# Patient Record
Sex: FEMALE | Race: WHITE | NOT HISPANIC OR LATINO | Employment: FULL TIME | URBAN - METROPOLITAN AREA
[De-identification: names, ages, dates, MRNs, and addresses within clinical notes are randomized per-mention and may not be internally consistent; named-entity substitution may affect disease eponyms.]

---

## 2018-02-09 ENCOUNTER — APPOINTMENT (OUTPATIENT)
Dept: MEDICAL GROUP | Facility: MEDICAL CENTER | Age: 38
End: 2018-02-09

## 2018-04-20 ENCOUNTER — HOSPITAL ENCOUNTER (OUTPATIENT)
Dept: LAB | Facility: MEDICAL CENTER | Age: 38
End: 2018-04-20
Attending: FAMILY MEDICINE
Payer: COMMERCIAL

## 2018-04-20 ENCOUNTER — OFFICE VISIT (OUTPATIENT)
Dept: MEDICAL GROUP | Facility: MEDICAL CENTER | Age: 38
End: 2018-04-20
Payer: COMMERCIAL

## 2018-04-20 VITALS
TEMPERATURE: 98.2 F | BODY MASS INDEX: 26.12 KG/M2 | DIASTOLIC BLOOD PRESSURE: 60 MMHG | HEART RATE: 69 BPM | WEIGHT: 153 LBS | HEIGHT: 64 IN | SYSTOLIC BLOOD PRESSURE: 100 MMHG | OXYGEN SATURATION: 100 % | RESPIRATION RATE: 16 BRPM

## 2018-04-20 DIAGNOSIS — F41.1 GAD (GENERALIZED ANXIETY DISORDER): ICD-10-CM

## 2018-04-20 LAB
ALBUMIN SERPL BCP-MCNC: 4.1 G/DL (ref 3.2–4.9)
ALBUMIN/GLOB SERPL: 1.6 G/DL
ALP SERPL-CCNC: 47 U/L (ref 30–99)
ALT SERPL-CCNC: 16 U/L (ref 2–50)
ANION GAP SERPL CALC-SCNC: 7 MMOL/L (ref 0–11.9)
AST SERPL-CCNC: 18 U/L (ref 12–45)
BILIRUB SERPL-MCNC: 0.5 MG/DL (ref 0.1–1.5)
BUN SERPL-MCNC: 16 MG/DL (ref 8–22)
CALCIUM SERPL-MCNC: 9.1 MG/DL (ref 8.5–10.5)
CHLORIDE SERPL-SCNC: 107 MMOL/L (ref 96–112)
CO2 SERPL-SCNC: 26 MMOL/L (ref 20–33)
CREAT SERPL-MCNC: 0.88 MG/DL (ref 0.5–1.4)
ERYTHROCYTE [DISTWIDTH] IN BLOOD BY AUTOMATED COUNT: 45.8 FL (ref 35.9–50)
GLOBULIN SER CALC-MCNC: 2.6 G/DL (ref 1.9–3.5)
GLUCOSE SERPL-MCNC: 109 MG/DL (ref 65–99)
HCT VFR BLD AUTO: 44.4 % (ref 37–47)
HGB BLD-MCNC: 14.3 G/DL (ref 12–16)
MCH RBC QN AUTO: 30.2 PG (ref 27–33)
MCHC RBC AUTO-ENTMCNC: 32.2 G/DL (ref 33.6–35)
MCV RBC AUTO: 93.9 FL (ref 81.4–97.8)
PLATELET # BLD AUTO: 258 K/UL (ref 164–446)
PMV BLD AUTO: 9.5 FL (ref 9–12.9)
POTASSIUM SERPL-SCNC: 4 MMOL/L (ref 3.6–5.5)
PROT SERPL-MCNC: 6.7 G/DL (ref 6–8.2)
RBC # BLD AUTO: 4.73 M/UL (ref 4.2–5.4)
SODIUM SERPL-SCNC: 140 MMOL/L (ref 135–145)
TSH SERPL DL<=0.005 MIU/L-ACNC: 0.37 UIU/ML (ref 0.38–5.33)
WBC # BLD AUTO: 8.3 K/UL (ref 4.8–10.8)

## 2018-04-20 PROCEDURE — 80053 COMPREHEN METABOLIC PANEL: CPT

## 2018-04-20 PROCEDURE — 99214 OFFICE O/P EST MOD 30 MIN: CPT | Performed by: FAMILY MEDICINE

## 2018-04-20 PROCEDURE — 85027 COMPLETE CBC AUTOMATED: CPT

## 2018-04-20 PROCEDURE — 84443 ASSAY THYROID STIM HORMONE: CPT

## 2018-04-20 PROCEDURE — 36415 COLL VENOUS BLD VENIPUNCTURE: CPT

## 2018-04-20 RX ORDER — VENLAFAXINE HYDROCHLORIDE 37.5 MG/1
37.5 CAPSULE, EXTENDED RELEASE ORAL DAILY
Qty: 90 CAP | Refills: 3 | Status: SHIPPED | OUTPATIENT
Start: 2018-04-20 | End: 2019-11-19

## 2018-04-20 ASSESSMENT — PATIENT HEALTH QUESTIONNAIRE - PHQ9: CLINICAL INTERPRETATION OF PHQ2 SCORE: 0

## 2018-04-20 NOTE — ASSESSMENT & PLAN NOTE
This is an acute problem that started for this patient probably back in October 2017. At that time she and her  discussed their marriage and talked about separation. Within the last 3 weeks she's made the decision that she would be happy her apartment together. 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 Having to figure out childcare issues for their 2 children who are 10 and 15 as well as just dealing with the changes that come with divorce. Patient finds that there are times or she feels overwhelmed and has to literally stepped away to take a moment for herself. She initially thought she was having depression but it sounds like she is having more problems with anxiety. She definitely has some sad feelings around everything but anxiety is the bigger contributor to her problem.

## 2018-04-20 NOTE — PROGRESS NOTES
CC:  The encounter diagnosis was JERAMIE (generalized anxiety disorder).    HISTORY OF THE PRESENT ILLNESS: Patient is a 37 y.o. female. This pleasant patient is here today to establish care and discuss her anxiety issues. 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 Patient will also be losing health care.    Health Maintenance: We will do her Pap at her follow-up visit      JERAMIE (generalized anxiety disorder)  This is an acute problem that started for this patient probably back in October 2017. At that time she and her  discussed their marriage and talked about separation. Within the last 3 weeks she's made the decision that she would be happy her apartment together. 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 Having to figure out childcare issues for their 2 children who are 10 and 15 as well as just dealing with the changes that come with divorce. Patient finds that there are times or she feels overwhelmed and has to literally stepped away to take a moment for herself. She initially thought she was having depression but it sounds like she is having more problems with anxiety. She definitely has some sad feelings around everything but anxiety is the bigger contributor to her problem.    Allergies: Patient has no known allergies.    Current Outpatient Prescriptions Ordered in [facility]   Medication Sig Dispense Refill   • venlafaxine XR (EFFEXOR XR) 37.5 MG CAPSULE SR 24 HR Take 1 Cap by mouth every day. 90 Cap 3     No current Epic-ordered facility-administered medications on file.        Past Medical History:   Diagnosis Date   • Anxiety    • Arrhythmia    • JERAMIE (generalized anxiety disorder) 4/20/2018   • Heart murmur    • Migraine     uncontrolled       Past Surgical History:    Procedure Laterality Date   • DENTAL EXTRACTION(S)         Social History   Substance Use Topics   • Smoking status: Former Smoker     Packs/day: 0.25     Years: 3.00     Types: Cigarettes     Quit date: 11/19/2000   • Smokeless tobacco: Never Used   • Alcohol use No      Comment: one/month       Social History     Social History Narrative   • No narrative on file       Family History   Problem Relation Age of Onset   • Heart Disease Mother      a fib   • Arthritis Mother    • Psychiatry Mother      anxiety   • Arthritis Father      disc herniation   • Psychiatry Father      anxiety/depression   • Hypertension Father    • Hypertension Brother    • Lung Disease Brother      smoker   • Heart Disease Paternal Uncle      MI   • Hypertension Paternal Uncle    • Hyperlipidemia Paternal Uncle    • Lung Disease Paternal Uncle      smoker   • Alcohol/Drug Paternal Uncle      etoh   • Other Maternal Grandmother      Hepatitis c- post treatment   • Hyperlipidemia Maternal Grandmother    • Hypertension Maternal Grandmother    • Arthritis Maternal Grandfather      back issues   • Cancer Paternal Grandmother      breast x 3   • Lung Disease Paternal Grandmother      smoker   • Lung Disease Paternal Grandfather      ex xmoker   • Hypertension Paternal Grandfather    • Lung Disease Paternal Uncle      smoker   • Alcohol/Drug Paternal Uncle      etoh       ROS:     - Constitutional: Negative for fever, chills, unexpected weight change, and fatigue/generalized weakness.     - HEENT: Negative for headaches, vision changes, hearing changes, ear pain, ear discharge, rhinorrhea, sinus congestion, sore throat, and neck pain.      - Respiratory: Negative for cough, sputum production, chest congestion, dyspnea, wheezing, and crackles.      - Cardiovascular: Negative for chest pain, palpitations, orthopnea, and bilateral lower extremity edema.     - Gastrointestinal: Negative for heartburn, nausea, vomiting, abdominal pain, hematochezia,  "melena, diarrhea, constipation, and greasy/foul-smelling stools.     - Genitourinary: Negative for dysuria, polyuria, hematuria, pyuria, urinary urgency, and urinary incontinence.     - Musculoskeletal: Negative for myalgias, back pain, and joint pain.     - Skin: Negative for rash, itching, cyanotic skin color change.     - Neurological: Negative for dizziness, tingling, tremors, focal sensory deficit, focal weakness and headaches.     - Endo/Heme/Allergies: Does not bruise/bleed easily.     - Psychiatric/Behavioral: Negative for depression, suicidal/homicidal ideation and memory loss.        - NOTE: All other systems reviewed and are negative, except as in HPI.      .      Exam: Blood pressure 100/60, pulse 69, temperature 36.8 °C (98.2 °F), resp. rate 16, height 1.626 m (5' 4\"), weight 69.4 kg (153 lb), last menstrual period 03/19/2018, SpO2 100 %, not currently breastfeeding. Body mass index is 26.26 kg/m².    General: Normal appearing. No distress.  HEENT: Normocephalic. Eyes conjunctiva clear lids without ptosis, pupils equal and reactive to light accommodation, ears normal shape and contour, canals are clear bilaterally, tympanic membranes are benign, nasal mucosa benign, oropharynx is without erythema, edema or exudates.   Neck: Supple without JVD or bruit. Thyroid is not enlarged.  Pulmonary: Clear to ausculation.  Normal effort. No rales, ronchi, or wheezing.  Cardiovascular: Regular rate and rhythm without murmur. Carotid and radial pulses are intact and equal bilaterally.  Abdomen: Soft, nontender, nondistended. Normal bowel sounds. Liver and spleen are not palpable  Neurologic: Grossly nonfocal  Lymph: No cervical, supraclavicular or axillary lymph nodes are palpable  Skin: Warm and dry.  No obvious lesions.  Musculoskeletal: Normal gait. No extremity cyanosis, clubbing, or edema.  Psych: Normal mood and affect. Alert and oriented x3. Judgment and insight is normal.    Please note that this dictation " was created using voice recognition software. I have made every reasonable attempt to correct obvious errors, but I expect that there are errors of grammar and possibly content that I did not discover before finalizing the note.      Assessment/Plan  1. JERAMIE (generalized anxiety disorder)  Uncontrolled, this patient has good reason to be having the anxiety that she is suffering with. We discussed treatment. In the past she's been on fluoxetine 20 mg but didn't find it to be that helpful. We will try Effexor XR 37.5 mg daily and then we can increase the dose. She is going to my chart me in 2 weeks and let me know how her mood is doing because we could then increase the dose at that time.  - COMP METABOLIC PANEL; Future  - CBC WITHOUT DIFFERENTIAL; Future  - TSH; Future

## 2018-06-01 ENCOUNTER — TELEPHONE (OUTPATIENT)
Dept: MEDICAL GROUP | Facility: MEDICAL CENTER | Age: 38
End: 2018-06-01

## 2018-06-01 NOTE — TELEPHONE ENCOUNTER
ESTABLISHED PATIENT PRE-VISIT PLANNING     Note: Patient will not be contacted if there is no indication to call.     1.  Reviewed notes from the last few office visits within the medical group: Yes 04/20/2018    2.  If any orders were placed at last visit or intended to be done for this visit (i.e. 6 mos follow-up), do we have Results/Consult Notes?        •  Labs - Labs ordered, completed on 04/20/2018 and results are in chart.   Note: If patient appointment is for lab review and patient did not complete labs, check with provider if OK to reschedule patient until labs completed.       •  Imaging - Imaging was not ordered at last office visit.       •  Referrals - No referrals were ordered at last office visit.    3. Is this appointment scheduled as a Hospital Follow-Up? No    4.  Immunizations were updated in Epic using WebIZ?: Yes       •  Web Iz Recommendations: TDAP    5.  Patient is due for the following Health Maintenance Topics:   Health Maintenance Due   Topic Date Due   • IMM DTaP/Tdap/Td Vaccine (1 - Tdap) 12/11/2000           6.  MDX printed for Provider? NO    7.  Patient was NOT informed to arrive 15 min prior to their scheduled appointment and bring in their medication bottles.

## 2018-06-29 ENCOUNTER — OFFICE VISIT (OUTPATIENT)
Dept: MEDICAL GROUP | Facility: MEDICAL CENTER | Age: 38
End: 2018-06-29
Payer: COMMERCIAL

## 2018-06-29 ENCOUNTER — HOSPITAL ENCOUNTER (OUTPATIENT)
Facility: MEDICAL CENTER | Age: 38
End: 2018-06-29
Attending: INTERNAL MEDICINE
Payer: COMMERCIAL

## 2018-06-29 VITALS
HEIGHT: 64 IN | BODY MASS INDEX: 24.65 KG/M2 | TEMPERATURE: 98.9 F | SYSTOLIC BLOOD PRESSURE: 122 MMHG | HEART RATE: 70 BPM | OXYGEN SATURATION: 100 % | WEIGHT: 144.4 LBS | DIASTOLIC BLOOD PRESSURE: 88 MMHG

## 2018-06-29 DIAGNOSIS — F41.1 GAD (GENERALIZED ANXIETY DISORDER): ICD-10-CM

## 2018-06-29 DIAGNOSIS — Z30.011 ORAL CONTRACEPTION INITIATION: ICD-10-CM

## 2018-06-29 DIAGNOSIS — Z12.4 SCREENING FOR MALIGNANT NEOPLASM OF CERVIX: ICD-10-CM

## 2018-06-29 DIAGNOSIS — Z00.00 ANNUAL PHYSICAL EXAM: ICD-10-CM

## 2018-06-29 PROCEDURE — 87624 HPV HI-RISK TYP POOLED RSLT: CPT

## 2018-06-29 PROCEDURE — 99395 PREV VISIT EST AGE 18-39: CPT | Performed by: INTERNAL MEDICINE

## 2018-06-29 PROCEDURE — 88175 CYTOPATH C/V AUTO FLUID REDO: CPT

## 2018-06-29 RX ORDER — NORETHINDRONE ACETATE AND ETHINYL ESTRADIOL 1.5-30(21)
1 KIT ORAL DAILY
Qty: 84 TAB | Refills: 0 | Status: SHIPPED | OUTPATIENT
Start: 2018-06-29 | End: 2018-08-23 | Stop reason: SDUPTHER

## 2018-06-29 NOTE — PROGRESS NOTES
CHIEF COMPLIANT:  Bobo Fry is a 37 y.o. female who presents for annual exam    Pt has GYN provider: no    Recommendations:  Regular exercise at least 4 days a week  Diet: advised balanced diet  Dental exam at least 1-2 times per year  Sunscreen use: advised    Immunizations:  TdaP:  Pending records    GYN  Last PAP:   2015, normal   Abnormal PAP: no    Menarche:      Menses every month with bleeding for 3 days  No excess cramping or bleeding.   Takes OTC analgesics for cramping  Contraception: want to discuss OCP    CURRENT MEDICATIONS  Current Outpatient Prescriptions   Medication Sig Dispense Refill   • norethindrone-ethinyl estradiol-iron (MICROGESTIN FE1.5/30) 1.5-30 MG-MCG tablet Take 1 Tab by mouth every day. 84 Tab 0   • venlafaxine XR (EFFEXOR XR) 37.5 MG CAPSULE SR 24 HR Take 1 Cap by mouth every day. 90 Cap 3     No current facility-administered medications for this visit.      ALLERGIES  Allergies: Patient has no known allergies.  PAST MEDICAL HISTORY  She  has a past medical history of Anxiety; Arrhythmia; JERAMIE (generalized anxiety disorder) (4/20/2018); Heart murmur; and Migraine. She also has no past medical history of Allergy; Anemia; Arthritis; Asthma; Blood transfusion without reported diagnosis; Cancer (HCC); Cataract; CHF (congestive heart failure) (HCC); Clotting disorder (HCC); COPD (chronic obstructive pulmonary disease) (HCC); Depression; Diabetes (HCC); Diabetic neuropathy (HCC); GERD (gastroesophageal reflux disease); Glaucoma; Goiter; Heart attack (HCC); HIV (human immunodeficiency virus infection) ([facility]); Hyperlipidemia; Hypertension; IBD (inflammatory bowel disease); Kidney disease; Meningitis; Pulmonary emphysema (HCC); Seizure (HCC); Stroke ([facility]); Substance abuse; or Tuberculosis.  SURGICAL HISTORY  She  has a past surgical history that includes dental extraction(s).  SOCIAL HISTORY  Social History   Substance Use Topics   • Smoking status: Former Smoker     Packs/day: 0.25      Years: 3.00     Types: Cigarettes     Quit date: 2000   • Smokeless tobacco: Never Used   • Alcohol use No      Comment: one/month     Social History     Social History Narrative   • No narrative on file     FAMILY HISTORY  Family History   Problem Relation Age of Onset   • Heart Disease Mother      a fib   • Arthritis Mother    • Psychiatry Mother      anxiety   • Arthritis Father      disc herniation   • Psychiatry Father      anxiety/depression   • Hypertension Father    • Hypertension Brother    • Lung Disease Brother      smoker   • Heart Disease Paternal Uncle      MI   • Hypertension Paternal Uncle    • Hyperlipidemia Paternal Uncle    • Lung Disease Paternal Uncle      smoker   • Alcohol/Drug Paternal Uncle      etoh   • Other Maternal Grandmother      Hepatitis c- post treatment   • Hyperlipidemia Maternal Grandmother    • Hypertension Maternal Grandmother    • Arthritis Maternal Grandfather      back issues   • Cancer Paternal Grandmother      breast x 3   • Lung Disease Paternal Grandmother      smoker   • Lung Disease Paternal Grandfather      ex xmoker   • Hypertension Paternal Grandfather    • Lung Disease Paternal Uncle      smoker   • Alcohol/Drug Paternal Uncle      etoh     Family Status   Relation Status   • Mother Alive, age 55y   • Father Alive    no contact   • Brother Alive, age 32y   • Maternal Uncle Alive   • Paternal Uncle    • Maternal Grandmother Alive   • Maternal Grandfather Alive   • Paternal Grandmother    • Paternal Grandfather Alive   • Paternal Uncle Alive     REVIEW OF SYSTEMS  Constitutional: Denies fever, chills, or sweats  Skin: negative for rash, scaling, itching, pigmentation, hair or nail changes.  Eyes: negative for visual blurring, double vision, eye pain, floaters and discharge from eyes  ENT: negative for tinnitus, vertigo, bleeding gums, dental problem and hoarseness, frequent URI's, sinus trouble, persistent sore throat  Respiratory: negative  "for persistent cough, hemoptysis, dyspnea, recurrent pneumonia or bronchitis, asthma and wheezing  Cardiovascular: negative for palpitations, tachycardia, irregular heart beat, chest pain, or peripheral edema.  Breast: Denies breast tenderness, mass, discharge, changes in size or contour, or abnormal cyclic discomfort.  Gastrointestinal: negative for poor appetite, dysphagia, nausea, heartburn or reflux, abdominal pain, hemorrhoids, constipation or diarrhea  Genitourinary: negative for dysuria, frequency, hesitancy, incontinence, sexually transmitted disease, abnormal vaginal discharge/bleeding, dysparunia   Musculoskeletal: negative for joint swelling and muscle pain/ soreness  Neuro: negative for migraine headaches, involuntary movements or tremor  Psychiatric: negative for excessive alcohol consumption or illegal drug use, sleep problems, anxiety, depression, sexual difficulties  Hematologic/Lymphatic/Immunologic: negative for anemia, unusual bruising, swollen glands, HIV risk factors  Endocrine: negative for temperature intolerance, polydipsia, polyuria.     PHYSICAL EXAMINATION:  Blood pressure 122/88, pulse 70, temperature 37.2 °C (98.9 °F), height 1.626 m (5' 4\"), weight 65.5 kg (144 lb 6.4 oz), SpO2 100 %.  Body mass index is 24.79 kg/m².  Wt Readings from Last 4 Encounters:   06/29/18 65.5 kg (144 lb 6.4 oz)   04/20/18 69.4 kg (153 lb)   11/19/15 82.6 kg (182 lb)     General appearance:healthy, well developed, well nourished, well-groomed  Psych: alert, no distress, cooperative. Eye contact good, speech goal directed. Affect calm.   Eyes: conjunctivae and sclerae normal, lids and lashes normal, EOMI, PERRLA  ENT: Ears: external ears normal to inspection, canals clear. TMs pearly gray with normal light reflex and anatomic landmarks, Nose/Sinuses: nose shows no deformity, asymmetry, or inflammation, nasal mucosa normal, no sinus tenderness,   Oropharynx: lips normal without lesions, buccal mucosa normal, gums " healthy, teeth intact, non-carious, palate normal, tongue midline and normal  Neck: no asymmetry, masses, adenopathy. Thyroid normal to palpation, carotids without bruits, no jugular venous distention  Lungs: clear to auscultation with good excursion, Normal respiratory rate. Chest symmetric no chest wall tenderness.  Cardiovascular: Regular rate and rhythm, no murmur.  No peripheral edema  Abdomen:  Soft, non-tender, no masses, HSM or palpable renal abnormalities. Bowel sounds normal, no bruits heard. No CVAT.  Musculoskeletal: no evidence of joint effusion, ROM of all joints is normal, no crepitation detected, strength grossly normal UE and LE, proximal and distal motor groups. No deformities present  Lymphatic: None significantly enlarged supraclavicular, axillary, or inguinal  Skin: color normal, vascularity normal, no rashes or suspicious lesions, no evidence of bleeding or bruising  Neuro: speech normal, mental status intact, gait grossly normal, muscle tone normal.  GYN: No suprapubic tenderness.  Perineum and external genitalia normal without rash.   Vagina with without discharge, normal mucosa.  Cervix w/o visible lesions or discharge. No CMT  Uterus normal size, non-tender, no masses,   Adnexa w/o mass or tenderness.   PAP smear was obtained.    LABS    None    ASSESSMENT/PLAN    1. Annual physical exam  Reviewed PMH, PSH, FH, SH, ALL, MEDS, HCM/IMM.   Advised healthy habits, diet, exercise.    2. Screening for malignant neoplasm of cervix  - THINPREP PAP WITH HPV; Future    3. Oral contraception initiation  - norethindrone-ethinyl estradiol-iron (MICROGESTIN FE1.5/30) 1.5-30 MG-MCG tablet; Take 1 Tab by mouth every day.  Dispense: 84 Tab; Refill: 0    - Reviewed effects and side effects of medication, the patient verbalized understanding    4. JERAMIE (generalized anxiety disorder)  Controlled, continue with current med    Smoking Counseling: Nonsmoker    Next office visit is due in  1 year    All questions  are answered.     Please note that this dictation was created using voice recognition software. Despite all efforts, some minor grammar mistakes are possible.

## 2018-06-30 DIAGNOSIS — Z12.4 SCREENING FOR MALIGNANT NEOPLASM OF CERVIX: ICD-10-CM

## 2018-07-03 LAB
CYTOLOGY REG CYTOL: NORMAL
HPV HR 12 DNA CVX QL NAA+PROBE: NEGATIVE
HPV16 DNA SPEC QL NAA+PROBE: NEGATIVE
HPV18 DNA SPEC QL NAA+PROBE: NEGATIVE
SPECIMEN SOURCE: NORMAL

## 2018-08-23 DIAGNOSIS — Z30.011 ORAL CONTRACEPTION INITIATION: ICD-10-CM

## 2018-08-23 RX ORDER — NORETHINDRONE ACETATE/ETHINYL ESTRADIOL AND FERROUS FUMARATE 1.5-30(21)
KIT ORAL
Qty: 84 TAB | Refills: 3 | Status: SHIPPED | OUTPATIENT
Start: 2018-08-23 | End: 2019-11-19 | Stop reason: SDUPTHER

## 2018-11-15 ENCOUNTER — HOSPITAL ENCOUNTER (OUTPATIENT)
Facility: MEDICAL CENTER | Age: 38
End: 2018-11-15
Attending: PHYSICIAN ASSISTANT

## 2018-11-15 ENCOUNTER — OFFICE VISIT (OUTPATIENT)
Dept: URGENT CARE | Facility: PHYSICIAN GROUP | Age: 38
End: 2018-11-15

## 2018-11-15 VITALS
TEMPERATURE: 98.1 F | SYSTOLIC BLOOD PRESSURE: 132 MMHG | WEIGHT: 145 LBS | HEIGHT: 64 IN | RESPIRATION RATE: 14 BRPM | BODY MASS INDEX: 24.75 KG/M2 | DIASTOLIC BLOOD PRESSURE: 90 MMHG | OXYGEN SATURATION: 95 % | HEART RATE: 112 BPM

## 2018-11-15 DIAGNOSIS — R30.0 DYSURIA: ICD-10-CM

## 2018-11-15 DIAGNOSIS — G43.809 OTHER MIGRAINE WITHOUT STATUS MIGRAINOSUS, NOT INTRACTABLE: ICD-10-CM

## 2018-11-15 DIAGNOSIS — R31.9 HEMATURIA, UNSPECIFIED TYPE: ICD-10-CM

## 2018-11-15 DIAGNOSIS — N30.01 ACUTE CYSTITIS WITH HEMATURIA: Primary | ICD-10-CM

## 2018-11-15 LAB
APPEARANCE UR: NORMAL
BILIRUB UR STRIP-MCNC: NORMAL MG/DL
COLOR UR AUTO: NORMAL
GLUCOSE UR STRIP.AUTO-MCNC: NORMAL MG/DL
KETONES UR STRIP.AUTO-MCNC: NORMAL MG/DL
LEUKOCYTE ESTERASE UR QL STRIP.AUTO: NORMAL
NITRITE UR QL STRIP.AUTO: NORMAL
PH UR STRIP.AUTO: 5.5 [PH] (ref 5–8)
PROT UR QL STRIP: 100 MG/DL
RBC UR QL AUTO: NORMAL
SP GR UR STRIP.AUTO: 1.02
UROBILINOGEN UR STRIP-MCNC: NORMAL MG/DL

## 2018-11-15 PROCEDURE — 99214 OFFICE O/P EST MOD 30 MIN: CPT | Performed by: PHYSICIAN ASSISTANT

## 2018-11-15 PROCEDURE — 87077 CULTURE AEROBIC IDENTIFY: CPT

## 2018-11-15 PROCEDURE — 87186 SC STD MICRODIL/AGAR DIL: CPT

## 2018-11-15 PROCEDURE — 87086 URINE CULTURE/COLONY COUNT: CPT

## 2018-11-15 PROCEDURE — 81002 URINALYSIS NONAUTO W/O SCOPE: CPT | Performed by: PHYSICIAN ASSISTANT

## 2018-11-15 RX ORDER — SUMATRIPTAN 50 MG/1
50 TABLET, FILM COATED ORAL
Qty: 10 TAB | Refills: 2 | Status: SHIPPED | OUTPATIENT
Start: 2018-11-15 | End: 2019-11-19

## 2018-11-15 RX ORDER — SULFAMETHOXAZOLE AND TRIMETHOPRIM 800; 160 MG/1; MG/1
1 TABLET ORAL EVERY 12 HOURS
Qty: 10 TAB | Refills: 0 | Status: SHIPPED | OUTPATIENT
Start: 2018-11-15 | End: 2018-11-20

## 2018-11-15 RX ORDER — PHENAZOPYRIDINE HYDROCHLORIDE 200 MG/1
200 TABLET, FILM COATED ORAL 3 TIMES DAILY
Qty: 6 TAB | Refills: 0 | Status: SHIPPED | OUTPATIENT
Start: 2018-11-15 | End: 2018-11-17

## 2018-11-15 NOTE — PROGRESS NOTES
Chief Complaint   Patient presents with   • Blood in Urine       HISTORY OF PRESENT ILLNESS: Patient is a 37 y.o. female who presents today because she has 2 complaints.    1.  She has a one-week history of increased urinary urgency, frequency, dysuria.  She has been trying over-the-counter cranberry pills and increasing her p.o. fluids but has not been helping.  Denies any fevers, chills, nausea, vomiting or diarrhea.    She also has what she describes as a migraine.  She states that she has diagnosed with migraines about 10 years ago but they have been kept at bay by staying hydrated, exercising regularly.  The last couple days she has had pain in her temporal areas bilaterally, right worse than left, no visual disturbances or changes, no numbness or tingling or auras.  She has been overusing over-the-counter anti-inflammatories without improvement.    Patient Active Problem List    Diagnosis Date Noted   • JERAMIE (generalized anxiety disorder) 04/20/2018       Allergies:Patient has no known allergies.    Current Outpatient Prescriptions Ordered in [facility]   Medication Sig Dispense Refill   • sulfamethoxazole-trimethoprim (BACTRIM DS) 800-160 MG tablet Take 1 Tab by mouth every 12 hours for 5 days. 10 Tab 0   • phenazopyridine (PYRIDIUM) 200 MG Tab Take 1 Tab by mouth 3 times a day for 2 days. 6 Tab 0   • SUMAtriptan (IMITREX) 50 MG Tab Take 1 Tab by mouth Once PRN for Migraine for up to 1 dose. 10 Tab 2   • PATITO FE 1.5/30 1.5-30 MG-MCG tablet TAKE 1 TABLET BY MOUTH ONCE DAILY 84 Tab 3   • venlafaxine XR (EFFEXOR XR) 37.5 MG CAPSULE SR 24 HR Take 1 Cap by mouth every day. 90 Cap 3     No current Epic-ordered facility-administered medications on file.        Past Medical History:   Diagnosis Date   • Anxiety    • Arrhythmia    • JERAMIE (generalized anxiety disorder) 4/20/2018   • Heart murmur    • Migraine     uncontrolled       Social History   Substance Use Topics   • Smoking status: Former Smoker     Packs/day: 0.25      Years: 3.00     Types: Cigarettes     Quit date: 2000   • Smokeless tobacco: Never Used   • Alcohol use No      Comment: one/month       Family Status   Relation Status   • Mo Alive, age 55y   • Fa Alive        no contact   • Bro Alive, age 32y   • MUnc Alive   • PUnc    • MGMo Alive   • MGFa Alive   • PGMo    • PGFa Alive   • PUnc Alive     Family History   Problem Relation Age of Onset   • Heart Disease Mother         a fib   • Arthritis Mother    • Psychiatry Mother         anxiety   • Arthritis Father         disc herniation   • Psychiatry Father         anxiety/depression   • Hypertension Father    • Hypertension Brother    • Lung Disease Brother         smoker   • Heart Disease Paternal Uncle         MI   • Hypertension Paternal Uncle    • Hyperlipidemia Paternal Uncle    • Lung Disease Paternal Uncle         smoker   • Alcohol/Drug Paternal Uncle         etoh   • Other Maternal Grandmother         Hepatitis c- post treatment   • Hyperlipidemia Maternal Grandmother    • Hypertension Maternal Grandmother    • Arthritis Maternal Grandfather         back issues   • Cancer Paternal Grandmother         breast x 3   • Lung Disease Paternal Grandmother         smoker   • Lung Disease Paternal Grandfather         ex xmoker   • Hypertension Paternal Grandfather    • Lung Disease Paternal Uncle         smoker   • Alcohol/Drug Paternal Uncle         etoh       ROS:  Review of Systems   Constitutional: Negative for fever, chills, weight loss and malaise/fatigue.   HENT: Negative for ear pain, nosebleeds, congestion, sore throat and neck pain.    Eyes: Negative for blurred vision.   Respiratory: Negative for cough, sputum production, shortness of breath and wheezing.    Cardiovascular: Negative for chest pain, palpitations, orthopnea and leg swelling.   Gastrointestinal: Negative for heartburn, nausea, vomiting and abdominal pain.   Genitourinary: Positive for dysuria, urgency and frequency.  "    Exam:  Blood pressure 132/90, pulse (!) 112, temperature 36.7 °C (98.1 °F), temperature source Temporal, resp. rate 14, height 1.626 m (5' 4\"), weight 65.8 kg (145 lb), SpO2 95 %, not currently breastfeeding.  General:  Well nourished, well developed female in NAD  Head:Normocephalic, atraumatic  Eyes: PERRLA, EOM within normal limits, no conjunctival injection, no scleral icterus, visual fields and acuity grossly intact.  Ears: Normal shape and symmetry, no tenderness, no discharge. External canals are without any significant edema or erythema. Tympanic membranes are without any inflammation, no effusion. Gross auditory acuity is intact  Nose: Symmetrical without tenderness, no discharge.  Mouth: reasonable hygiene, no erythema exudates or tonsillar enlargement.  Neck: no masses, range of motion within normal limits, no tracheal deviation. No obvious thyroid enlargement.  Pulmonary: chest is symmetrical with respiration, no wheezes, crackles, or rhonchi.  Cardiovascular: regular rate and rhythm without murmurs, rubs, or gallops.  Extremities: no clubbing, cyanosis, or edema.    Urinalysis in the office shows a large amount of leukocyte esterase, and a large amount of blood    Please note that this dictation was created using voice recognition software. I have made every reasonable attempt to correct obvious errors, but I expect that there are errors of grammar and possibly content that I did not discover before finalizing the note.    Assessment/Plan:  1. Acute cystitis with hematuria  Urine Culture    sulfamethoxazole-trimethoprim (BACTRIM DS) 800-160 MG tablet   2. Dysuria  phenazopyridine (PYRIDIUM) 200 MG Tab   3. Hematuria, unspecified type  POCT Urinalysis   4. Other migraine without status migrainosus, not intractable  SUMAtriptan (IMITREX) 50 MG Tab   Continue to increase p.o. fluids.    Followup with primary care in the next 7-10 days if not significantly improving, return to the urgent care or go to " the emergency room sooner for any worsening of symptoms.

## 2018-11-18 LAB
BACTERIA UR CULT: ABNORMAL
BACTERIA UR CULT: ABNORMAL
SIGNIFICANT IND 70042: ABNORMAL
SITE SITE: ABNORMAL
SOURCE SOURCE: ABNORMAL

## 2019-11-19 ENCOUNTER — OFFICE VISIT (OUTPATIENT)
Dept: MEDICAL GROUP | Facility: MEDICAL CENTER | Age: 39
End: 2019-11-19
Payer: COMMERCIAL

## 2019-11-19 VITALS
DIASTOLIC BLOOD PRESSURE: 64 MMHG | HEIGHT: 64 IN | BODY MASS INDEX: 27.66 KG/M2 | TEMPERATURE: 98.2 F | WEIGHT: 162.04 LBS | HEART RATE: 86 BPM | SYSTOLIC BLOOD PRESSURE: 122 MMHG | RESPIRATION RATE: 12 BRPM | OXYGEN SATURATION: 96 %

## 2019-11-19 DIAGNOSIS — Z00.00 WELLNESS EXAMINATION: ICD-10-CM

## 2019-11-19 DIAGNOSIS — F41.1 GAD (GENERALIZED ANXIETY DISORDER): ICD-10-CM

## 2019-11-19 DIAGNOSIS — Z30.011 ORAL CONTRACEPTION INITIATION: ICD-10-CM

## 2019-11-19 DIAGNOSIS — Z30.8 ENCOUNTER FOR OTHER CONTRACEPTIVE MANAGEMENT: ICD-10-CM

## 2019-11-19 DIAGNOSIS — G43.009 MIGRAINE WITHOUT AURA AND WITHOUT STATUS MIGRAINOSUS, NOT INTRACTABLE: ICD-10-CM

## 2019-11-19 PROBLEM — G43.909 MIGRAINE WITHOUT STATUS MIGRAINOSUS, NOT INTRACTABLE: Status: ACTIVE | Noted: 2019-11-19

## 2019-11-19 PROCEDURE — 99395 PREV VISIT EST AGE 18-39: CPT | Performed by: FAMILY MEDICINE

## 2019-11-19 RX ORDER — SUMATRIPTAN 100 MG/1
100 TABLET, FILM COATED ORAL
Qty: 10 TAB | Refills: 3 | Status: SHIPPED | OUTPATIENT
Start: 2019-11-19 | End: 2020-11-24

## 2019-11-19 RX ORDER — NORETHINDRONE ACETATE AND ETHINYL ESTRADIOL 1.5-30(21)
KIT ORAL
Qty: 84 TAB | Refills: 3 | Status: SHIPPED | OUTPATIENT
Start: 2019-11-19 | End: 2020-08-03

## 2019-11-19 ASSESSMENT — PATIENT HEALTH QUESTIONNAIRE - PHQ9: CLINICAL INTERPRETATION OF PHQ2 SCORE: 0

## 2019-11-19 NOTE — ASSESSMENT & PLAN NOTE
This patient had a Pap in 2018 that was completely normal.  She is good for her Pap for the coming 3 years.  She is interested in looking at a more permanent form of birth control.  She is currently using birth control pills.  We talked about having her tubes tied versus having an IUD placed.  Either way she needs to be seen by 1 of the GYNs.  I will put in that referral and then she can be seen by them and have that discussion.

## 2019-11-19 NOTE — ASSESSMENT & PLAN NOTE
This is a chronic health problem for this patient for which she utilizes Imitrex 50 mg but there are times where she has had to take a second dose.  I would like to move her up to the 100 mg dose.  For the most part these will take care of her migraines.  She typically has about 1/month about a week ahead of her menstrual cycles.  We will try her on a higher dose and write that for her today.

## 2019-11-19 NOTE — ASSESSMENT & PLAN NOTE
This had been a problem for the patient in the past that she was going through her divorce.  She is now working out on a regular basis and finds that she no longer needs the Effexor extended release.  We will discontinue that from her medications.  She really is doing well just utilizing her own natural endorphins that she gets from working out.

## 2019-11-19 NOTE — PROGRESS NOTES
CC:Diagnoses of Encounter for other contraceptive management, Migraine without aura and without status migrainosus, not intractable, Oral contraception initiation, JERAMIE (generalized anxiety disorder), and Wellness examination were pertinent to this visit.    HISTORY OF PRESENT ILLNESS: Patient is a 38 y.o. female established patient who presents today to to have her annual wellness exam.  Patient does not need a Pap smear at this time.  Her last Pap was done in 2018 and was completely normal.  She is good till 2021.  She does have some concerns about looking for longer term contraception and then needs refills of her meds.      Encounter for other contraceptive management  This patient had a Pap in 2018 that was completely normal.  She is good for her Pap for the coming 3 years.  She is interested in looking at a more permanent form of birth control.  She is currently using birth control pills.  We talked about having her tubes tied versus having an IUD placed.  Either way she needs to be seen by 1 of the GYNs.  I will put in that referral and then she can be seen by them and have that discussion.    Migraine without status migrainosus, not intractable  This is a chronic health problem for this patient for which she utilizes Imitrex 50 mg but there are times where she has had to take a second dose.  I would like to move her up to the 100 mg dose.  For the most part these will take care of her migraines.  She typically has about 1/month about a week ahead of her menstrual cycles.  We will try her on a higher dose and write that for her today.    JERAMIE (generalized anxiety disorder)  This had been a problem for the patient in the past that she was going through her divorce.  She is now working out on a regular basis and finds that she no longer needs the Effexor extended release.  We will discontinue that from her medications.  She really is doing well just utilizing her own natural endorphins that she gets from working  out.    Wellness examination  Patient here today to have her wellness exam      Patient Active Problem List    Diagnosis Date Noted   • Encounter for other contraceptive management 2019   • Migraine without status migrainosus, not intractable 2019   • Wellness examination 2019   • JERAMIE (generalized anxiety disorder) 2018      Allergies:Patient has no known allergies.    Current Outpatient Medications   Medication Sig Dispense Refill   • sumatriptan (IMITREX) 100 MG tablet Take 1 Tab by mouth Once PRN for Migraine for up to 1 dose. 10 Tab 3   • norethindrone-ethinyl estradiol-iron (PATITO FE 1.530) 1.5-30 MG-MCG tablet TAKE 1 TABLET BY MOUTH ONCE DAILY 84 Tab 3     No current facility-administered medications for this visit.        Social History     Tobacco Use   • Smoking status: Former Smoker     Packs/day: 0.25     Years: 3.00     Pack years: 0.75     Types: Cigarettes     Last attempt to quit: 2000     Years since quittin.0   • Smokeless tobacco: Never Used   Substance Use Topics   • Alcohol use: No     Alcohol/week: 0.0 oz     Comment: one/month   • Drug use: No     Social History     Patient does not qualify to have social determinant information on file (likely too young).   Social History Narrative   • Not on file       Family History   Problem Relation Age of Onset   • Heart Disease Mother         a fib   • Arthritis Mother    • Psychiatric Illness Mother         anxiety   • Arthritis Father         disc herniation   • Psychiatric Illness Father         anxiety/depression   • Hypertension Father    • Hypertension Brother    • Lung Disease Brother         smoker   • Heart Disease Paternal Uncle         MI   • Hypertension Paternal Uncle    • Hyperlipidemia Paternal Uncle    • Lung Disease Paternal Uncle         smoker   • Alcohol/Drug Paternal Uncle         etoh   • Other Maternal Grandmother         Hepatitis c- post treatment   • Hyperlipidemia Maternal Grandmother    •  "Hypertension Maternal Grandmother    • Arthritis Maternal Grandfather         back issues   • Cancer Paternal Grandmother         breast x 3   • Lung Disease Paternal Grandmother         smoker   • Lung Disease Paternal Grandfather         ex xmoker   • Hypertension Paternal Grandfather    • Lung Disease Paternal Uncle         smoker   • Alcohol/Drug Paternal Uncle         etoh        ROS:     - Constitutional:  Negative for fever, chills, unexpected weight change, and fatigue/generalized weakness.    - HEENT:  Negative for headaches, vision changes, hearing changes, ear pain, ear discharge, rhinorrhea, sinus congestion, sore throat, and neck pain.      - Respiratory: Negative for cough, sputum production, chest congestion, dyspnea, wheezing, and crackles.      - Cardiovascular: Negative for chest pain, palpitations, orthopnea, and bilateral lower extremity edema.     - Gastrointestinal: Negative for heartburn, nausea, vomiting, abdominal pain, hematochezia, melena, diarrhea, constipation, and greasy/foul-smelling stools.     - Genitourinary: Negative for dysuria, polyuria, hematuria, pyuria, urinary urgency, and urinary incontinence.     - Musculoskeletal: Negative for myalgias, back pain, and joint pain.     - Skin: Negative for rash, itching, cyanotic skin color change.     - Neurological: Negative for dizziness, tingling, tremors, focal sensory deficit, focal weakness and headaches.     - Endo/Heme/Allergies: Does not bruise/bleed easily.     - Psychiatric/Behavioral: Negative for depression, suicidal/homicidal ideation and memory loss.          - NOTE: All other systems reviewed and are negative, except as in HPI.      Exam:    /64 (BP Location: Left arm, Patient Position: Sitting, BP Cuff Size: Adult)   Pulse 86   Temp 36.8 °C (98.2 °F) (Temporal)   Resp 12   Ht 1.626 m (5' 4\")   Wt 73.5 kg (162 lb 0.6 oz)   SpO2 96%  Body mass index is 27.81 kg/m².    General:  Well nourished, well developed " female in NAD  Head is grossly normal.  Neck: Supple without JVD or bruit. Thyroid is not enlarged.  Pulmonary: Clear to ausculation and percussion.  Normal effort. No rales, ronchi, or wheezing.  Cardiovascular: Regular rate and rhythm without murmur. Carotid and radial pulses are intact and equal bilaterally.  Extremities: no clubbing, cyanosis, or edema.    Please note that this dictation was created using voice recognition software. I have made every reasonable attempt to correct obvious errors, but I expect that there are errors of grammar and possibly content that I did not discover before finalizing the note.    Assessment/Plan:  1. Encounter for other contraceptive management  Referring to OB/GYN to discuss contraceptive choices  - REFERRAL TO OB/GYN    2. Migraine without aura and without status migrainosus, not intractable  Well-controlled but will increase her dose of Imitrex to 100 mg new prescription provided.    3. Oral contraception initiation  Prescription provided for the coming year of birth control but patient will talk with OB/GYN about getting an IUD versus tubal ligation.  - norethindrone-ethinyl estradiol-iron (PATITO FE 1.5/30) 1.5-30 MG-MCG tablet; TAKE 1 TABLET BY MOUTH ONCE DAILY  Dispense: 84 Tab; Refill: 3    4. JERAMIE (generalized anxiety disorder)  Patient no longer having severe anxiety and able to come off of medication.  Doing very well.  5. Wellness exam  Overall patient doing very well

## 2020-01-16 ENCOUNTER — OFFICE VISIT (OUTPATIENT)
Dept: URGENT CARE | Facility: PHYSICIAN GROUP | Age: 40
End: 2020-01-16
Payer: COMMERCIAL

## 2020-01-16 VITALS
BODY MASS INDEX: 28.34 KG/M2 | HEIGHT: 64 IN | RESPIRATION RATE: 12 BRPM | SYSTOLIC BLOOD PRESSURE: 132 MMHG | DIASTOLIC BLOOD PRESSURE: 88 MMHG | OXYGEN SATURATION: 99 % | HEART RATE: 84 BPM | TEMPERATURE: 97.4 F | WEIGHT: 166 LBS

## 2020-01-16 DIAGNOSIS — J02.9 PHARYNGITIS, UNSPECIFIED ETIOLOGY: ICD-10-CM

## 2020-01-16 DIAGNOSIS — J02.9 SORE THROAT: ICD-10-CM

## 2020-01-16 LAB
INT CON NEG: NEGATIVE
INT CON POS: POSITIVE
S PYO AG THROAT QL: NEGATIVE

## 2020-01-16 PROCEDURE — 99214 OFFICE O/P EST MOD 30 MIN: CPT | Performed by: PHYSICIAN ASSISTANT

## 2020-01-16 PROCEDURE — 87880 STREP A ASSAY W/OPTIC: CPT | Performed by: PHYSICIAN ASSISTANT

## 2020-01-16 RX ORDER — AMOXICILLIN 875 MG/1
875 TABLET, COATED ORAL 2 TIMES DAILY
Qty: 20 TAB | Refills: 0 | Status: SHIPPED | OUTPATIENT
Start: 2020-01-16 | End: 2020-01-26

## 2020-01-17 NOTE — PROGRESS NOTES
Chief Complaint   Patient presents with   • Pharyngitis       HISTORY OF PRESENT ILLNESS: Patient is a 39 y.o. female who presents today because she has a 2-day history of sore throat, headaches, chills, has not taken her temperature.  She has been using Motrin for symptoms with minimal improvement.    Patient Active Problem List    Diagnosis Date Noted   • Encounter for other contraceptive management 2019   • Migraine without status migrainosus, not intractable 2019   • Wellness examination 2019   • JERAMIE (generalized anxiety disorder) 2018       Allergies:Patient has no known allergies.    Current Outpatient Medications Ordered in Epic   Medication Sig Dispense Refill   • maalox plus-benadryl-visc lidocaine (MAGIC MOUTHWASH) Take 5 mL by mouth every 6 hours as needed. 90 mL 0   • amoxicillin (AMOXIL) 875 MG tablet Take 1 Tab by mouth 2 times a day for 10 days. 20 Tab 0   • norethindrone-ethinyl estradiol-iron (PATITO FE 1.5/30) 1.5-30 MG-MCG tablet TAKE 1 TABLET BY MOUTH ONCE DAILY 84 Tab 3   • sumatriptan (IMITREX) 100 MG tablet Take 1 Tab by mouth Once PRN for Migraine for up to 1 dose. (Patient not taking: Reported on 2020) 10 Tab 3     No current Epic-ordered facility-administered medications on file.        Past Medical History:   Diagnosis Date   • Anxiety    • Arrhythmia    • Encounter for other contraceptive management 2019   • JERAMIE (generalized anxiety disorder) 2018   • Heart murmur    • Migraine     uncontrolled   • Migraine without status migrainosus, not intractable 2019       Social History     Tobacco Use   • Smoking status: Former Smoker     Packs/day: 0.25     Years: 3.00     Pack years: 0.75     Types: Cigarettes     Last attempt to quit: 2000     Years since quittin.1   • Smokeless tobacco: Never Used   Substance Use Topics   • Alcohol use: No     Alcohol/week: 0.0 oz     Comment: one/month   • Drug use: No       Family Status   Relation Name  Status   • Mo  Alive, age 56y   • Fa  Alive        no contact   • Bro  Alive, age 33y   • MUnc  Alive   • PUnc     • MGMo  Alive   • MGFa  Alive   • PGMo     • PGFa  Alive   • PUnc  Alive     Family History   Problem Relation Age of Onset   • Heart Disease Mother         a fib   • Arthritis Mother    • Psychiatric Illness Mother         anxiety   • Arthritis Father         disc herniation   • Psychiatric Illness Father         anxiety/depression   • Hypertension Father    • Hypertension Brother    • Lung Disease Brother         smoker   • Heart Disease Paternal Uncle         MI   • Hypertension Paternal Uncle    • Hyperlipidemia Paternal Uncle    • Lung Disease Paternal Uncle         smoker   • Alcohol/Drug Paternal Uncle         etoh   • Other Maternal Grandmother         Hepatitis c- post treatment   • Hyperlipidemia Maternal Grandmother    • Hypertension Maternal Grandmother    • Arthritis Maternal Grandfather         back issues   • Cancer Paternal Grandmother         breast x 3   • Lung Disease Paternal Grandmother         smoker   • Lung Disease Paternal Grandfather         ex xmoker   • Hypertension Paternal Grandfather    • Lung Disease Paternal Uncle         smoker   • Alcohol/Drug Paternal Uncle         etoh       ROS:  Review of Systems   Constitutional: Negative for fever, positive for chills, no weight loss and malaise/fatigue.   HENT: Negative for ear pain, nosebleeds, congestion, positive for sore throat and neck pain.    Eyes: Negative for blurred vision.   Respiratory: Negative for cough, sputum production, shortness of breath and wheezing.    Cardiovascular: Negative for chest pain, palpitations, orthopnea and leg swelling.   Gastrointestinal: Negative for heartburn, nausea, vomiting and abdominal pain.   Genitourinary: Negative for dysuria, urgency and frequency.     Exam:  /88 (BP Location: Right arm, Patient Position: Sitting, BP Cuff Size: Adult)   Pulse 84   Temp 36.3  "°C (97.4 °F) (Temporal)   Resp 12   Ht 1.626 m (5' 4\")   Wt 75.3 kg (166 lb)   SpO2 99%   General:  Well nourished, well developed female in NAD  Head:Normocephalic, atraumatic  Eyes: PERRLA, EOM within normal limits, no conjunctival injection, no scleral icterus, visual fields and acuity grossly intact.  Ears: Normal shape and symmetry, no tenderness, no discharge. External canals are without any significant edema or erythema. Tympanic membranes are without any inflammation, no effusion. Gross auditory acuity is intact  Nose: Symmetrical without tenderness, no discharge.  Mouth: reasonable hygiene, she has pharyngeal arch erythema without exudates or tonsillar enlargement.  Neck: There is minimal bilateral anterior cervical lymph node enlargement and tenderness, range of motion within normal limits, no tracheal deviation. No obvious thyroid enlargement.  Extremities: no clubbing, cyanosis, or edema.    Strep test is negative    Please note that this dictation was created using voice recognition software. I have made every reasonable attempt to correct obvious errors, but I expect that there are errors of grammar and possibly content that I did not discover before finalizing the note.    Assessment/Plan:  1. Sore throat  POCT Rapid Strep A    maalox plus-benadryl-visc lidocaine (MAGIC MOUTHWASH)   2. Pharyngitis, unspecified etiology  amoxicillin (AMOXIL) 875 MG tablet   Continue Tylenol or ibuprofen as needed. abx on contingency    Followup with primary care in the next 7-10 days if not significantly improving, return to the urgent care or go to the emergency room sooner for any worsening of symptoms.       "

## 2020-07-31 DIAGNOSIS — Z30.011 ORAL CONTRACEPTION INITIATION: ICD-10-CM

## 2020-08-03 RX ORDER — NORETHINDRONE ACETATE AND ETHINYL ESTRADIOL 1.5-30(21)
KIT ORAL
Qty: 84 TAB | Refills: 3 | Status: SHIPPED | OUTPATIENT
Start: 2020-08-03 | End: 2020-11-24

## 2020-11-24 ENCOUNTER — OFFICE VISIT (OUTPATIENT)
Dept: URGENT CARE | Facility: PHYSICIAN GROUP | Age: 40
End: 2020-11-24
Payer: COMMERCIAL

## 2020-11-24 ENCOUNTER — HOSPITAL ENCOUNTER (OUTPATIENT)
Facility: MEDICAL CENTER | Age: 40
End: 2020-11-24
Attending: PHYSICIAN ASSISTANT
Payer: COMMERCIAL

## 2020-11-24 VITALS
TEMPERATURE: 98.2 F | OXYGEN SATURATION: 98 % | SYSTOLIC BLOOD PRESSURE: 144 MMHG | HEIGHT: 64 IN | WEIGHT: 171 LBS | HEART RATE: 100 BPM | DIASTOLIC BLOOD PRESSURE: 92 MMHG | RESPIRATION RATE: 16 BRPM | BODY MASS INDEX: 29.19 KG/M2

## 2020-11-24 DIAGNOSIS — R35.0 URINARY FREQUENCY: ICD-10-CM

## 2020-11-24 DIAGNOSIS — R39.15 URINARY URGENCY: ICD-10-CM

## 2020-11-24 DIAGNOSIS — N30.01 ACUTE CYSTITIS WITH HEMATURIA: ICD-10-CM

## 2020-11-24 DIAGNOSIS — R30.0 DYSURIA: ICD-10-CM

## 2020-11-24 LAB
APPEARANCE UR: NORMAL
BILIRUB UR STRIP-MCNC: NORMAL MG/DL
COLOR UR AUTO: NORMAL
GLUCOSE UR STRIP.AUTO-MCNC: NORMAL MG/DL
KETONES UR STRIP.AUTO-MCNC: NORMAL MG/DL
LEUKOCYTE ESTERASE UR QL STRIP.AUTO: NORMAL
NITRITE UR QL STRIP.AUTO: NORMAL
PH UR STRIP.AUTO: 6.5 [PH] (ref 5–8)
PROT UR QL STRIP: 100 MG/DL
RBC UR QL AUTO: NORMAL
SP GR UR STRIP.AUTO: 1.01
UROBILINOGEN UR STRIP-MCNC: NORMAL MG/DL

## 2020-11-24 PROCEDURE — 87086 URINE CULTURE/COLONY COUNT: CPT

## 2020-11-24 PROCEDURE — 81002 URINALYSIS NONAUTO W/O SCOPE: CPT | Performed by: PHYSICIAN ASSISTANT

## 2020-11-24 PROCEDURE — 87077 CULTURE AEROBIC IDENTIFY: CPT

## 2020-11-24 PROCEDURE — 99214 OFFICE O/P EST MOD 30 MIN: CPT | Performed by: PHYSICIAN ASSISTANT

## 2020-11-24 PROCEDURE — 87186 SC STD MICRODIL/AGAR DIL: CPT

## 2020-11-24 RX ORDER — CEFDINIR 300 MG/1
300 CAPSULE ORAL EVERY 12 HOURS
Qty: 10 CAP | Refills: 0 | Status: SHIPPED | OUTPATIENT
Start: 2020-11-24 | End: 2020-11-29

## 2020-11-24 RX ORDER — PHENAZOPYRIDINE HYDROCHLORIDE 200 MG/1
200 TABLET, FILM COATED ORAL 3 TIMES DAILY PRN
Qty: 10 TAB | Refills: 0 | Status: SHIPPED | OUTPATIENT
Start: 2020-11-24 | End: 2022-04-20

## 2020-11-24 NOTE — LETTER
November 24, 2020         Patient: Bobo Fry   YOB: 1980   Date of Visit: 11/24/2020           To Whom it May Concern:    Bobo Fry was seen in my clinic on 11/24/2020. Please excuse her for missing work tomorrow due to illness.    If you have any questions or concerns, please don't hesitate to call.        Sincerely,           Mildred Farrell P.A.-C.  Electronically Signed

## 2020-11-25 DIAGNOSIS — N30.01 ACUTE CYSTITIS WITH HEMATURIA: ICD-10-CM

## 2020-11-25 NOTE — PROGRESS NOTES
Chief Complaint   Patient presents with   • UTI       HISTORY OF PRESENT ILLNESS: Patient is a 39 y.o. female who presents today for the following:    Patient comes in for evaluation of dysuria that started today.  She complains of increased urinary frequency and urgency, lower abdominal discomfort, low back pain.  She does report nausea but denies fever and vomiting.  She does report history of UTI and states it feels the same.    Patient Active Problem List    Diagnosis Date Noted   • Encounter for other contraceptive management 2019   • Migraine without status migrainosus, not intractable 2019   • Wellness examination 2019   • JERAMIE (generalized anxiety disorder) 2018       Allergies:Patient has no known allergies.    Current Outpatient Medications Ordered in Epic   Medication Sig Dispense Refill   • cefdinir (OMNICEF) 300 MG Cap Take 1 Cap by mouth every 12 hours for 5 days. 10 Cap 0   • phenazopyridine (PYRIDIUM) 200 MG Tab Take 1 Tab by mouth 3 times a day as needed for Mild Pain. 10 Tab 0     No current Epic-ordered facility-administered medications on file.        Past Medical History:   Diagnosis Date   • Anxiety    • Arrhythmia    • Encounter for other contraceptive management 2019   • JERAMIE (generalized anxiety disorder) 2018   • Heart murmur    • Migraine     uncontrolled   • Migraine without status migrainosus, not intractable 2019       Social History     Tobacco Use   • Smoking status: Former Smoker     Packs/day: 0.25     Years: 3.00     Pack years: 0.75     Types: Cigarettes     Quit date: 2000     Years since quittin.0   • Smokeless tobacco: Never Used   Substance Use Topics   • Alcohol use: No     Alcohol/week: 0.0 oz     Comment: one/month   • Drug use: No       Family Status   Relation Name Status   • Mo  Alive, age 57y   • Fa  Alive        no contact   • Bro  Alive, age 34y   • MUnc  Alive   • PUnc     • MGMo  Alive   • MGFa  Alive   •  "PGMo     • PGFa  Alive   • PUnc  Alive     Family History   Problem Relation Age of Onset   • Heart Disease Mother         a fib   • Arthritis Mother    • Psychiatric Illness Mother         anxiety   • Arthritis Father         disc herniation   • Psychiatric Illness Father         anxiety/depression   • Hypertension Father    • Hypertension Brother    • Lung Disease Brother         smoker   • Heart Disease Paternal Uncle         MI   • Hypertension Paternal Uncle    • Hyperlipidemia Paternal Uncle    • Lung Disease Paternal Uncle         smoker   • Alcohol/Drug Paternal Uncle         etoh   • Other Maternal Grandmother         Hepatitis c- post treatment   • Hyperlipidemia Maternal Grandmother    • Hypertension Maternal Grandmother    • Arthritis Maternal Grandfather         back issues   • Cancer Paternal Grandmother         breast x 3   • Lung Disease Paternal Grandmother         smoker   • Lung Disease Paternal Grandfather         ex xmoker   • Hypertension Paternal Grandfather    • Lung Disease Paternal Uncle         smoker   • Alcohol/Drug Paternal Uncle         etoh       Review of Systems:    Constitutional ROS: No unexpected change in weight, No weakness, No fatigue  Pulmonary ROS: No chronic cough, sputum, or hemoptysis, No dyspnea on exertion, No wheezing  Cardiovascular ROS: No diaphoresis, No edema, No palpitations  Gastrointestinal ROS: No change in bowel habits, No significant change in appetite, No nausea, vomiting, diarrhea, or constipation  Hematologic/Lymphatic ROS: No chills, No night sweats, No weight loss  Skin/Integumentary ROS: No edema, No evidence of rash, No itching      Exam:  /92 (BP Location: Right arm, Patient Position: Sitting, BP Cuff Size: Adult)   Pulse 100   Temp 36.8 °C (98.2 °F) (Temporal)   Resp 16   Ht 1.626 m (5' 4\")   Wt 77.6 kg (171 lb)   SpO2 98%   General: Well developed, well nourished. No distress.  Pulmonary: Unlabored respiratory effort.   Back: No " CVA tenderness noted.  Neurologic: Grossly nonfocal. No facial asymmetry noted.  Skin: Warm, dry, good turgor. No rashes in visible areas.   Psych: Normal mood. Alert and oriented x3. Judgment and insight is normal.    UA: Large blood, 100 protein, small leukocyte esterase, otherwise negative    Assessment/Plan:  Drink plenty of fluids. Will contact patient with culture results. Use all medication as directed. Follow up for worsening or persistent symptoms.  1. Acute cystitis with hematuria  Urine Culture    cefdinir (OMNICEF) 300 MG Cap   2. Dysuria  POCT Urinalysis    phenazopyridine (PYRIDIUM) 200 MG Tab   3. Urinary frequency  POCT Urinalysis    phenazopyridine (PYRIDIUM) 200 MG Tab   4. Urinary urgency  POCT Urinalysis    phenazopyridine (PYRIDIUM) 200 MG Tab

## 2020-12-08 ENCOUNTER — TELEPHONE (OUTPATIENT)
Dept: URGENT CARE | Facility: PHYSICIAN GROUP | Age: 40
End: 2020-12-08

## 2020-12-08 DIAGNOSIS — N30.01 ACUTE CYSTITIS WITH HEMATURIA: ICD-10-CM

## 2020-12-08 RX ORDER — NITROFURANTOIN 25; 75 MG/1; MG/1
100 CAPSULE ORAL 2 TIMES DAILY
Qty: 10 CAP | Refills: 0 | Status: SHIPPED | OUTPATIENT
Start: 2020-12-08 | End: 2020-12-13

## 2020-12-08 NOTE — TELEPHONE ENCOUNTER
Pt called saying her UTI symptoms have not cleared up. She wants to know if you are willing to send more antibiotics to her pharmacy or if she needs to be seen again. Her phone # is [phone] or [phone]

## 2020-12-09 NOTE — TELEPHONE ENCOUNTER
I'll send in another round. She should have it rechecked if she still isn't feeling better to make sure it's not caused by a different bacteria.

## 2022-03-22 ENCOUNTER — TELEPHONE (OUTPATIENT)
Dept: SCHEDULING | Facility: IMAGING CENTER | Age: 42
End: 2022-03-22

## 2022-04-20 ENCOUNTER — OFFICE VISIT (OUTPATIENT)
Dept: MEDICAL GROUP | Facility: PHYSICIAN GROUP | Age: 42
End: 2022-04-20
Payer: COMMERCIAL

## 2022-04-20 VITALS
DIASTOLIC BLOOD PRESSURE: 78 MMHG | SYSTOLIC BLOOD PRESSURE: 138 MMHG | BODY MASS INDEX: 31.79 KG/M2 | HEART RATE: 85 BPM | WEIGHT: 179.4 LBS | RESPIRATION RATE: 12 BRPM | TEMPERATURE: 97.7 F | OXYGEN SATURATION: 100 % | HEIGHT: 63 IN

## 2022-04-20 DIAGNOSIS — F32.A ANXIETY AND DEPRESSION: ICD-10-CM

## 2022-04-20 DIAGNOSIS — N92.6 IRREGULAR MENSTRUAL BLEEDING: ICD-10-CM

## 2022-04-20 DIAGNOSIS — F41.9 ANXIETY AND DEPRESSION: ICD-10-CM

## 2022-04-20 DIAGNOSIS — Z00.00 HEALTH CARE MAINTENANCE: ICD-10-CM

## 2022-04-20 DIAGNOSIS — Z80.9 FAMILY HISTORY OF CANCER: ICD-10-CM

## 2022-04-20 DIAGNOSIS — Z23 NEED FOR VACCINATION: ICD-10-CM

## 2022-04-20 DIAGNOSIS — Z12.31 ENCOUNTER FOR SCREENING MAMMOGRAM FOR MALIGNANT NEOPLASM OF BREAST: ICD-10-CM

## 2022-04-20 PROCEDURE — 90471 IMMUNIZATION ADMIN: CPT

## 2022-04-20 PROCEDURE — 99214 OFFICE O/P EST MOD 30 MIN: CPT | Mod: 25

## 2022-04-20 PROCEDURE — 90715 TDAP VACCINE 7 YRS/> IM: CPT

## 2022-04-20 RX ORDER — HYDROXYZINE HYDROCHLORIDE 25 MG/1
25 TABLET, FILM COATED ORAL 3 TIMES DAILY PRN
Qty: 30 TABLET | Refills: 1 | Status: SHIPPED | OUTPATIENT
Start: 2022-04-20 | End: 2023-03-10 | Stop reason: SDUPTHER

## 2022-04-20 RX ORDER — FLUOXETINE HYDROCHLORIDE 20 MG/1
20 CAPSULE ORAL DAILY
Qty: 30 CAPSULE | Refills: 1 | Status: SHIPPED | OUTPATIENT
Start: 2022-04-20 | End: 2022-05-12

## 2022-04-20 SDOH — ECONOMIC STABILITY: INCOME INSECURITY: HOW HARD IS IT FOR YOU TO PAY FOR THE VERY BASICS LIKE FOOD, HOUSING, MEDICAL CARE, AND HEATING?: NOT VERY HARD

## 2022-04-20 SDOH — ECONOMIC STABILITY: INCOME INSECURITY: IN THE LAST 12 MONTHS, WAS THERE A TIME WHEN YOU WERE NOT ABLE TO PAY THE MORTGAGE OR RENT ON TIME?: NO

## 2022-04-20 SDOH — HEALTH STABILITY: PHYSICAL HEALTH: ON AVERAGE, HOW MANY DAYS PER WEEK DO YOU ENGAGE IN MODERATE TO STRENUOUS EXERCISE (LIKE A BRISK WALK)?: 4 DAYS

## 2022-04-20 SDOH — ECONOMIC STABILITY: TRANSPORTATION INSECURITY
IN THE PAST 12 MONTHS, HAS THE LACK OF TRANSPORTATION KEPT YOU FROM MEDICAL APPOINTMENTS OR FROM GETTING MEDICATIONS?: NO

## 2022-04-20 SDOH — ECONOMIC STABILITY: HOUSING INSECURITY
IN THE LAST 12 MONTHS, WAS THERE A TIME WHEN YOU DID NOT HAVE A STEADY PLACE TO SLEEP OR SLEPT IN A SHELTER (INCLUDING NOW)?: NO

## 2022-04-20 SDOH — ECONOMIC STABILITY: FOOD INSECURITY: WITHIN THE PAST 12 MONTHS, THE FOOD YOU BOUGHT JUST DIDN'T LAST AND YOU DIDN'T HAVE MONEY TO GET MORE.: NEVER TRUE

## 2022-04-20 SDOH — ECONOMIC STABILITY: TRANSPORTATION INSECURITY
IN THE PAST 12 MONTHS, HAS LACK OF TRANSPORTATION KEPT YOU FROM MEETINGS, WORK, OR FROM GETTING THINGS NEEDED FOR DAILY LIVING?: NO

## 2022-04-20 SDOH — ECONOMIC STABILITY: HOUSING INSECURITY: IN THE LAST 12 MONTHS, HOW MANY PLACES HAVE YOU LIVED?: 1

## 2022-04-20 SDOH — HEALTH STABILITY: PHYSICAL HEALTH: ON AVERAGE, HOW MANY MINUTES DO YOU ENGAGE IN EXERCISE AT THIS LEVEL?: 60 MIN

## 2022-04-20 SDOH — ECONOMIC STABILITY: FOOD INSECURITY: WITHIN THE PAST 12 MONTHS, YOU WORRIED THAT YOUR FOOD WOULD RUN OUT BEFORE YOU GOT MONEY TO BUY MORE.: NEVER TRUE

## 2022-04-20 SDOH — ECONOMIC STABILITY: TRANSPORTATION INSECURITY
IN THE PAST 12 MONTHS, HAS LACK OF RELIABLE TRANSPORTATION KEPT YOU FROM MEDICAL APPOINTMENTS, MEETINGS, WORK OR FROM GETTING THINGS NEEDED FOR DAILY LIVING?: NO

## 2022-04-20 SDOH — HEALTH STABILITY: MENTAL HEALTH
STRESS IS WHEN SOMEONE FEELS TENSE, NERVOUS, ANXIOUS, OR CAN'T SLEEP AT NIGHT BECAUSE THEIR MIND IS TROUBLED. HOW STRESSED ARE YOU?: RATHER MUCH

## 2022-04-20 ASSESSMENT — SOCIAL DETERMINANTS OF HEALTH (SDOH)
DO YOU BELONG TO ANY CLUBS OR ORGANIZATIONS SUCH AS CHURCH GROUPS UNIONS, FRATERNAL OR ATHLETIC GROUPS, OR SCHOOL GROUPS?: NO
HOW OFTEN DO YOU ATTENT MEETINGS OF THE CLUB OR ORGANIZATION YOU BELONG TO?: PATIENT DECLINED
IN A TYPICAL WEEK, HOW MANY TIMES DO YOU TALK ON THE PHONE WITH FAMILY, FRIENDS, OR NEIGHBORS?: THREE TIMES A WEEK
DO YOU BELONG TO ANY CLUBS OR ORGANIZATIONS SUCH AS CHURCH GROUPS UNIONS, FRATERNAL OR ATHLETIC GROUPS, OR SCHOOL GROUPS?: NO
HOW OFTEN DO YOU ATTEND CHURCH OR RELIGIOUS SERVICES?: NEVER
HOW OFTEN DO YOU ATTEND CHURCH OR RELIGIOUS SERVICES?: NEVER
HOW HARD IS IT FOR YOU TO PAY FOR THE VERY BASICS LIKE FOOD, HOUSING, MEDICAL CARE, AND HEATING?: NOT VERY HARD
ARE YOU MARRIED, WIDOWED, DIVORCED, SEPARATED, NEVER MARRIED, OR LIVING WITH A PARTNER?: LIVING WITH PARTNER
IN A TYPICAL WEEK, HOW MANY TIMES DO YOU TALK ON THE PHONE WITH FAMILY, FRIENDS, OR NEIGHBORS?: THREE TIMES A WEEK
HOW OFTEN DO YOU ATTENT MEETINGS OF THE CLUB OR ORGANIZATION YOU BELONG TO?: PATIENT DECLINED
HOW OFTEN DO YOU GET TOGETHER WITH FRIENDS OR RELATIVES?: ONCE A WEEK
HOW OFTEN DO YOU GET TOGETHER WITH FRIENDS OR RELATIVES?: ONCE A WEEK
WITHIN THE PAST 12 MONTHS, YOU WORRIED THAT YOUR FOOD WOULD RUN OUT BEFORE YOU GOT THE MONEY TO BUY MORE: NEVER TRUE
HOW OFTEN DO YOU HAVE SIX OR MORE DRINKS ON ONE OCCASION: NEVER
HOW MANY DRINKS CONTAINING ALCOHOL DO YOU HAVE ON A TYPICAL DAY WHEN YOU ARE DRINKING: 1 OR 2
ARE YOU MARRIED, WIDOWED, DIVORCED, SEPARATED, NEVER MARRIED, OR LIVING WITH A PARTNER?: LIVING WITH PARTNER
HOW OFTEN DO YOU HAVE A DRINK CONTAINING ALCOHOL: MONTHLY OR LESS

## 2022-04-20 ASSESSMENT — ENCOUNTER SYMPTOMS
ABDOMINAL PAIN: 1
DEPRESSION: 1
NERVOUS/ANXIOUS: 1

## 2022-04-20 ASSESSMENT — ANXIETY QUESTIONNAIRES
7. FEELING AFRAID AS IF SOMETHING AWFUL MIGHT HAPPEN: NOT AT ALL
4. TROUBLE RELAXING: SEVERAL DAYS
5. BEING SO RESTLESS THAT IT IS HARD TO SIT STILL: SEVERAL DAYS
1. FEELING NERVOUS, ANXIOUS, OR ON EDGE: SEVERAL DAYS
6. BECOMING EASILY ANNOYED OR IRRITABLE: SEVERAL DAYS
3. WORRYING TOO MUCH ABOUT DIFFERENT THINGS: SEVERAL DAYS
2. NOT BEING ABLE TO STOP OR CONTROL WORRYING: SEVERAL DAYS
IF YOU CHECKED OFF ANY PROBLEMS ON THIS QUESTIONNAIRE, HOW DIFFICULT HAVE THESE PROBLEMS MADE IT FOR YOU TO DO YOUR WORK, TAKE CARE OF THINGS AT HOME, OR GET ALONG WITH OTHER PEOPLE: SOMEWHAT DIFFICULT
GAD7 TOTAL SCORE: 6

## 2022-04-20 ASSESSMENT — PATIENT HEALTH QUESTIONNAIRE - PHQ9
CLINICAL INTERPRETATION OF PHQ2 SCORE: 2
SUM OF ALL RESPONSES TO PHQ QUESTIONS 1-9: 8
5. POOR APPETITE OR OVEREATING: 1 - SEVERAL DAYS

## 2022-04-20 ASSESSMENT — LIFESTYLE VARIABLES
HOW OFTEN DO YOU HAVE SIX OR MORE DRINKS ON ONE OCCASION: NEVER
HOW MANY STANDARD DRINKS CONTAINING ALCOHOL DO YOU HAVE ON A TYPICAL DAY: 1 OR 2
HOW OFTEN DO YOU HAVE A DRINK CONTAINING ALCOHOL: MONTHLY OR LESS

## 2022-04-20 NOTE — PROGRESS NOTES
Subjective:     CC:  Diagnoses of Irregular menstrual bleeding, Anxiety and depression, Family history of cancer, Need for vaccination, Encounter for screening mammogram for malignant neoplasm of breast, and Health care maintenance were pertinent to this visit.    HISTORY OF THE PRESENT ILLNESS: Patient is a 41 y.o. female. This pleasant patient is here today to establish care and discuss the following problems:    Problem   Irregular Menstrual Bleeding    Patient reports 6-8 months of irregular periods. Menstraul cycle erratic in nature: Will go 3-4 weeks at times between cycles then at other times will bleed every 2 weeks.  Usual flow described as light, but recently has experienced very heavy flow with pain.  She does have an IUD -Mirena in place since 1.5 years.  She is experiencing pelvic discomfort even when not on menses.  She describes the pain as feeling pressurized/engorged.  She experiences some discomfort most days of the week but rates the worst pain at a 9/10. No pain with sexual intercourse.  Denies constitutional symptoms of fever chills unintentional weight loss or fatigue. Dr[facility] ([facility]) placed IUD, but she has not been seen recently.      Anxiety and Depression    -Chronic intermittent condition.  JERAMIE-7=8, PHQ-9= 6.  Patient denies suicidal ideations.  Recurrent episode of depression/anxiety.  Many stressors current life stressors.  She has used Prozac in the past with good relief.  Denies any side effects from past medication use. She would like to try Prozac again.  States to have good support system at home.  Ascribes to a healthy diet and exercises most days of the week.         Health Maintenance: Tdap given    ROS:   Review of Systems   Gastrointestinal: Positive for abdominal pain (Pelvic pain x6 to 8 months).   Genitourinary:        Irregular menstrual periods   Psychiatric/Behavioral: Positive for depression. The patient is nervous/anxious.          Objective:  "    Exam: /78 (BP Location: Left arm, Patient Position: Sitting, BP Cuff Size: Large adult)   Pulse 85   Temp 36.5 °C (97.7 °F) (Temporal)   Resp 12   Ht 1.603 m (5' 3.1\")   Wt 81.4 kg (179 lb 6.4 oz)   SpO2 100%  Body mass index is 31.68 kg/m².    Physical Exam  Constitutional:       General: She is not in acute distress.     Appearance: Normal appearance. She is not ill-appearing or toxic-appearing.   HENT:      Head: Normocephalic.   Eyes:      Conjunctiva/sclera: Conjunctivae normal.      Pupils: Pupils are equal, round, and reactive to light.   Pulmonary:      Effort: Pulmonary effort is normal.   Skin:     General: Skin is warm and dry.   Neurological:      General: No focal deficit present.      Mental Status: She is alert and oriented to person, place, and time.   Psychiatric:         Mood and Affect: Mood normal.         Behavior: Behavior normal.           Labs: No current labs    Assessment & Plan: Medical Decision Making   41 y.o. female with the following -    Problem List Items Addressed This Visit     Irregular menstrual bleeding     - Subacute ongoing condition  -Transvaginal/abdominal ultrasound ordered           Relevant Orders    US-PELVIC COMPLETE (TRANSABDOMINAL/TRANSVAGINAL) (COMBO)    CBC WITHOUT DIFFERENTIAL    Comp Metabolic Panel    Lipid Profile    TSH WITH REFLEX TO FT4    VITAMIN D,25 HYDROXY    Anxiety and depression     - Chronic condition currently active  -Prozac 20 mg daily ordered.  Side effects, risk/benefits discussed.  -Follow-up recommended in 4 weeks to assess efficacy of medication  -Recommend behavioral counseling, patient may consider this option in the future  -ED precautions given and known for suicidal ideations         Relevant Medications    FLUoxetine (PROZAC) 20 MG Cap    hydrOXYzine HCl (ATARAX) 25 MG Tab      Other Visit Diagnoses     Family history of cancer        Relevant Orders    Referral to Genetic Research Studies    Need for vaccination        " Relevant Orders    Tdap Vaccine =>6YO IM (Completed)    Encounter for screening mammogram for malignant neoplasm of breast        Relevant Orders    MA-SCREENING MAMMO BILAT W/TOMOSYNTHESIS W/CAD    Health care maintenance        Relevant Orders    HEMOGLOBIN A1C    CBC WITHOUT DIFFERENTIAL    Comp Metabolic Panel    Lipid Profile    TSH WITH REFLEX TO FT4    VITAMIN D,25 HYDROXY          Differential diagnosis, natural history, supportive care, and indications for immediate follow-up discussed.  Shared decision making approach was utilized, and patient is amendable with plan of care.  Patient understands to return to clinic or go to the emergency department if symptoms worsen. All questions and concerns addressed.      Return in about 4 weeks (around 5/18/2022) for Wellness with Pap, depression follow-up.    Please note that this dictation was created using voice recognition software. I have made every reasonable attempt to correct obvious errors, but I expect that there are errors of grammar and possibly content that I did not discover before finalizing the note.

## 2022-04-20 NOTE — ASSESSMENT & PLAN NOTE
- Chronic condition currently active  -Prozac 20 mg daily ordered.  Side effects, risk/benefits discussed.  -Follow-up recommended in 4 weeks to assess efficacy of medication  -Recommend behavioral counseling, patient may consider this option in the future  -ED precautions given and known for suicidal ideations

## 2022-04-26 ENCOUNTER — HOSPITAL ENCOUNTER (OUTPATIENT)
Dept: LAB | Facility: MEDICAL CENTER | Age: 42
End: 2022-04-26
Payer: COMMERCIAL

## 2022-04-26 DIAGNOSIS — Z00.00 HEALTH CARE MAINTENANCE: ICD-10-CM

## 2022-04-26 DIAGNOSIS — N92.6 IRREGULAR MENSTRUAL BLEEDING: ICD-10-CM

## 2022-04-26 LAB
25(OH)D3 SERPL-MCNC: 57 NG/ML (ref 30–100)
ALBUMIN SERPL BCP-MCNC: 5 G/DL (ref 3.2–4.9)
ALBUMIN/GLOB SERPL: 2 G/DL
ALP SERPL-CCNC: 60 U/L (ref 30–99)
ALT SERPL-CCNC: 15 U/L (ref 2–50)
ANION GAP SERPL CALC-SCNC: 13 MMOL/L (ref 7–16)
AST SERPL-CCNC: 19 U/L (ref 12–45)
BILIRUB SERPL-MCNC: 0.6 MG/DL (ref 0.1–1.5)
BUN SERPL-MCNC: 21 MG/DL (ref 8–22)
CALCIUM SERPL-MCNC: 9.4 MG/DL (ref 8.5–10.5)
CHLORIDE SERPL-SCNC: 107 MMOL/L (ref 96–112)
CHOLEST SERPL-MCNC: 149 MG/DL (ref 100–199)
CO2 SERPL-SCNC: 22 MMOL/L (ref 20–33)
CREAT SERPL-MCNC: 0.97 MG/DL (ref 0.5–1.4)
ERYTHROCYTE [DISTWIDTH] IN BLOOD BY AUTOMATED COUNT: 42 FL (ref 35.9–50)
EST. AVERAGE GLUCOSE BLD GHB EST-MCNC: 103 MG/DL
FASTING STATUS PATIENT QL REPORTED: NORMAL
GFR SERPLBLD CREATININE-BSD FMLA CKD-EPI: 76 ML/MIN/1.73 M 2
GLOBULIN SER CALC-MCNC: 2.5 G/DL (ref 1.9–3.5)
GLUCOSE SERPL-MCNC: 93 MG/DL (ref 65–99)
HBA1C MFR BLD: 5.2 % (ref 4–5.6)
HCT VFR BLD AUTO: 45.2 % (ref 37–47)
HDLC SERPL-MCNC: 45 MG/DL
HGB BLD-MCNC: 15.3 G/DL (ref 12–16)
LDLC SERPL CALC-MCNC: 95 MG/DL
MCH RBC QN AUTO: 31.1 PG (ref 27–33)
MCHC RBC AUTO-ENTMCNC: 33.8 G/DL (ref 33.6–35)
MCV RBC AUTO: 91.9 FL (ref 81.4–97.8)
PLATELET # BLD AUTO: 264 K/UL (ref 164–446)
PMV BLD AUTO: 9.4 FL (ref 9–12.9)
POTASSIUM SERPL-SCNC: 4.4 MMOL/L (ref 3.6–5.5)
PROT SERPL-MCNC: 7.5 G/DL (ref 6–8.2)
RBC # BLD AUTO: 4.92 M/UL (ref 4.2–5.4)
SODIUM SERPL-SCNC: 142 MMOL/L (ref 135–145)
TRIGL SERPL-MCNC: 45 MG/DL (ref 0–149)
TSH SERPL DL<=0.005 MIU/L-ACNC: 0.88 UIU/ML (ref 0.38–5.33)
WBC # BLD AUTO: 6.4 K/UL (ref 4.8–10.8)

## 2022-04-26 PROCEDURE — 84443 ASSAY THYROID STIM HORMONE: CPT

## 2022-04-26 PROCEDURE — 80053 COMPREHEN METABOLIC PANEL: CPT

## 2022-04-26 PROCEDURE — 80061 LIPID PANEL: CPT

## 2022-04-26 PROCEDURE — 83036 HEMOGLOBIN GLYCOSYLATED A1C: CPT

## 2022-04-26 PROCEDURE — 82306 VITAMIN D 25 HYDROXY: CPT

## 2022-04-26 PROCEDURE — 85027 COMPLETE CBC AUTOMATED: CPT

## 2022-04-26 PROCEDURE — 36415 COLL VENOUS BLD VENIPUNCTURE: CPT

## 2022-04-28 ENCOUNTER — HOSPITAL ENCOUNTER (OUTPATIENT)
Dept: RADIOLOGY | Facility: MEDICAL CENTER | Age: 42
End: 2022-04-28
Payer: COMMERCIAL

## 2022-04-28 DIAGNOSIS — N92.6 IRREGULAR MENSTRUAL BLEEDING: ICD-10-CM

## 2022-04-28 PROCEDURE — 76830 TRANSVAGINAL US NON-OB: CPT

## 2022-04-29 DIAGNOSIS — D25.1 INTRAMURAL LEIOMYOMA OF UTERUS: ICD-10-CM

## 2022-04-29 DIAGNOSIS — N92.6 IRREGULAR MENSTRUAL BLEEDING: ICD-10-CM

## 2022-04-30 NOTE — PROGRESS NOTES
Patient reports irregular periods over the past year.  Sent message today that she has been bleeding since 3-30- 22, menses in the past week has been very heavy with clots and cheeks experiencing profuse abdominal pain.  Recent transvaginal ultrasound showed leiomyoma of the uterine wall 1 cm.  She has an IUD that has been troublesome since insertion a year and a half ago.  She would likely benefit from referral to gynecology.  Referral placed

## 2022-05-18 ENCOUNTER — HOSPITAL ENCOUNTER (OUTPATIENT)
Dept: RADIOLOGY | Facility: MEDICAL CENTER | Age: 42
End: 2022-05-18
Payer: COMMERCIAL

## 2022-05-18 ENCOUNTER — HOSPITAL ENCOUNTER (OUTPATIENT)
Facility: MEDICAL CENTER | Age: 42
End: 2022-05-18
Payer: COMMERCIAL

## 2022-05-18 ENCOUNTER — OFFICE VISIT (OUTPATIENT)
Dept: MEDICAL GROUP | Facility: PHYSICIAN GROUP | Age: 42
End: 2022-05-18
Payer: COMMERCIAL

## 2022-05-18 VITALS
RESPIRATION RATE: 20 BRPM | BODY MASS INDEX: 29.48 KG/M2 | WEIGHT: 166.4 LBS | HEART RATE: 88 BPM | TEMPERATURE: 98.2 F | OXYGEN SATURATION: 96 % | HEIGHT: 63 IN | SYSTOLIC BLOOD PRESSURE: 130 MMHG | DIASTOLIC BLOOD PRESSURE: 76 MMHG

## 2022-05-18 DIAGNOSIS — Z11.51 SCREENING FOR HPV (HUMAN PAPILLOMAVIRUS): ICD-10-CM

## 2022-05-18 DIAGNOSIS — Z01.419 ENCOUNTER FOR GYNECOLOGICAL EXAMINATION: ICD-10-CM

## 2022-05-18 DIAGNOSIS — Z12.31 ENCOUNTER FOR SCREENING MAMMOGRAM FOR MALIGNANT NEOPLASM OF BREAST: ICD-10-CM

## 2022-05-18 DIAGNOSIS — Z12.4 SCREENING FOR CERVICAL CANCER: ICD-10-CM

## 2022-05-18 PROCEDURE — 88175 CYTOPATH C/V AUTO FLUID REDO: CPT

## 2022-05-18 PROCEDURE — 99396 PREV VISIT EST AGE 40-64: CPT

## 2022-05-18 PROCEDURE — 87624 HPV HI-RISK TYP POOLED RSLT: CPT

## 2022-05-18 PROCEDURE — 77063 BREAST TOMOSYNTHESIS BI: CPT

## 2022-05-18 PROCEDURE — 99000 SPECIMEN HANDLING OFFICE-LAB: CPT

## 2022-05-18 ASSESSMENT — FIBROSIS 4 INDEX: FIB4 SCORE: 0.74

## 2022-05-18 NOTE — PROGRESS NOTES
Subjective:     CC:   Chief Complaint   Patient presents with   • Annual Exam   • Depression   • Gynecologic Exam     Pap       HPI:   oBbo Fry is a 41 y.o. female who presents for annual exam    Patient has GYN provider: Yes  Last Pap Smear:     H/O Abnormal Pap: No  Last Mammogram: 22   Last Bone Density Test: none  Last Colorectal Cancer Screening: none  Last Tdap: UTD  Received HPV series: No    Exercise: moderate regular exercise, aerobic < 3 days a week  Diet: good      No LMP recorded.  Hx STDs: No  Birth control: IUD removed now on pill  Menses every month with 5 days with moderate bleeding.  Reports mild cramping and does take OTC analgesics for cramps.  No significant bloating/fluid retention, pelvic pain, or dyspareunia. No abnormal vaginal discharge.  No breast tenderness, mass, nipple discharge, changes in size or contour, or abnormal cyclic discomfort.    OB History    Para Term  AB Living   3 2 0 0 1 0   SAB IAB Ectopic Molar Multiple Live Births   1 0 0 0 0 0      She  reports previously being sexually active and has had partner(s) who are male. She reports using the following method of birth control/protection: I.U.D..    She  has a past medical history of Anxiety, Anxiety and depression (2022), Arrhythmia, Encounter for other contraceptive management (2019), JERAMIE (generalized anxiety disorder) (2018), Heart murmur, Migraine, and Migraine without status migrainosus, not intractable (2019).    She has no past medical history of Allergy, Anemia, Arthritis, Asthma, Blood transfusion without reported diagnosis, Cancer ([facility]), Cataract, CHF (congestive heart failure) ([facility]), Clotting disorder ([facility]), COPD (chronic obstructive pulmonary disease) ([facility]), Diabetes ([facility]), Diabetic neuropathy ([facility]), GERD (gastroesophageal reflux disease), Glaucoma, Goiter, Heart attack ([facility]), HIV (human immunodeficiency virus infection) ([facility]), Hyperlipidemia,  Hypertension, IBD (inflammatory bowel disease), Kidney disease, Meningitis, Pulmonary emphysema (HCC), Seizure (HCC), Stroke (HCC), Substance abuse (HCC), or Tuberculosis.  She  has a past surgical history that includes dental extraction(s).    Family History   Problem Relation Age of Onset   • Heart Disease Mother         a fib   • Arthritis Mother    • Psychiatric Illness Mother         anxiety   • Diabetes Mother    • Arthritis Father         disc herniation   • Psychiatric Illness Father         anxiety/depression   • Hypertension Father    • Hypertension Brother    • Lung Disease Brother         smoker   • Heart Disease Paternal Uncle         MI   • Hypertension Paternal Uncle    • Hyperlipidemia Paternal Uncle    • Lung Disease Paternal Uncle         smoker   • Alcohol/Drug Paternal Uncle         etoh   • Other Maternal Grandmother         Hepatitis c- post treatment   • Hyperlipidemia Maternal Grandmother    • Hypertension Maternal Grandmother    • Arthritis Maternal Grandmother    • Arthritis Maternal Grandfather         back issues   • Cancer Paternal Grandmother         breast x 3   • Lung Disease Paternal Grandmother         smoker   • Breast Cancer Paternal Grandmother    • Lung Disease Paternal Grandfather         ex xmoker   • Hypertension Paternal Grandfather    • Lung Disease Paternal Uncle         smoker   • Alcohol/Drug Paternal Uncle         etoh     Social History     Tobacco Use   • Smoking status: Former Smoker     Packs/day: 0.25     Years: 3.00     Pack years: 0.75     Types: Cigarettes     Quit date: 2000     Years since quittin.5   • Smokeless tobacco: Never Used   Vaping Use   • Vaping Use: Never used   Substance Use Topics   • Alcohol use: No     Alcohol/week: 0.0 oz     Comment: one/month   • Drug use: No       Patient Active Problem List    Diagnosis Date Noted   • Irregular menstrual bleeding 2022   • Anxiety and depression 2022   • Encounter for other  "contraceptive management 11/19/2019   • Migraine without status migrainosus, not intractable 11/19/2019   • Wellness examination 11/19/2019   • JERAMIE (generalized anxiety disorder) 04/20/2018     Current Outpatient Medications   Medication Sig Dispense Refill   • FLUoxetine (PROZAC) 20 MG Cap TAKE 1 CAPSULE BY MOUTH EVERY DAY 90 Capsule 3   • Multiple Vitamin (MULTIVITAMIN PO) Take 2 Each by mouth every day. Gummies     • Multiple Vitamins-Minerals (ZINC PO) Take 1 Tablet by mouth every day.     • VITAMIN D PO Take 1 Capsule by mouth every day.     • COLLAGEN PO Take 4 Capsules by mouth every day.     • hydrOXYzine HCl (ATARAX) 25 MG Tab Take 1 Tablet by mouth 3 times a day as needed for Anxiety. 30 Tablet 1     No current facility-administered medications for this visit.     No Known Allergies    Review of Systems   Constitutional: Negative for fever, chills and malaise/fatigue.   HENT: Negative for congestion.    Eyes: Negative for pain.   Respiratory: Negative for cough and shortness of breath.    Cardiovascular: Negative for chest pain and leg swelling.   Gastrointestinal: Negative for nausea, vomiting, abdominal pain and diarrhea.   Genitourinary: Negative for dysuria and hematuria.   Skin: Negative for rash.   Neurological: Negative for dizziness, focal weakness and headaches.   Endo/Heme/Allergies: Does not bruise/bleed easily.   Psychiatric/Behavioral: Negative for depression.  The patient is not nervous/anxious.      Objective:   /76 (BP Location: Left arm, Patient Position: Sitting, BP Cuff Size: Adult)   Pulse 88   Temp 36.8 °C (98.2 °F) (Temporal)   Resp 20   Ht 1.603 m (5' 3.1\")   Wt 75.5 kg (166 lb 6.4 oz)   SpO2 96%   BMI 29.38 kg/m²     Wt Readings from Last 4 Encounters:   05/18/22 75.5 kg (166 lb 6.4 oz)   04/20/22 81.4 kg (179 lb 6.4 oz)   11/24/20 77.6 kg (171 lb)   01/16/20 75.3 kg (166 lb)       A chaperone was offered to the patient during today's exam. Patient declined " chaperone.    Physical Exam:  Constitutional: Well-developed and well-nourished. Not diaphoretic. No distress.   Skin: Skin is warm and dry. No rash noted.  Head: Atraumatic without lesions.  Eyes: Conjunctivae and extraocular motions are normal. Pupils are equal, round, and reactive to light. No scleral icterus.   Ears:  External ears unremarkable. Tympanic membranes clear and intact.  Nose: Nares patent. Septum midline. Turbinates without erythema nor edema. No discharge.   Mouth/Throat: Tongue normal. Oropharynx is clear and moist. Posterior pharynx without erythema or exudates.  Neck: Supple, trachea midline. Normal range of motion. No thyromegaly present. No lymphadenopathy--cervical or supraclavicular.  Cardiovascular: Regular rate and rhythm, S1 and S2 without murmur, rubs, or gallops.    Respiratory: Effort normal. Clear to auscultation throughout. No adventitious sounds.   Abdomen: Soft, non tender, and without distention. Active bowel sounds in all four quadrants. No rebound, guarding, masses or HSM.  : Perineum and external genitalia normal without rash. Vagina with normal and physiologic discharge. Cervix without visible lesions or discharge.  Negative bimanual exam without adnexal masses or cervical motion tenderness.  Extremities: No cyanosis, clubbing, erythema, nor edema. Distal pulses intact and symmetric.   Musculoskeletal: All major joints AROM full in all directions without pain.  Neurological: Alert and oriented x 3. Grossly non-focal. Strength and sensation grossly intact. DTRs 2+/3 and symmetric.   Psychiatric:  Behavior, mood, and affect are appropriate.    Assessment and Plan:     There are no diagnoses linked to this encounter.    Health maintenance: Complete  Labs per orders  Immunizations per orders  Patient counseled about skin care, diet, supplements, and exercise.  Discussed  mammography screening     Follow-up: Return in about 6 months (around 11/18/2022).

## 2022-12-16 ENCOUNTER — OFFICE VISIT (OUTPATIENT)
Dept: URGENT CARE | Facility: PHYSICIAN GROUP | Age: 42
End: 2022-12-16
Payer: COMMERCIAL

## 2022-12-16 VITALS
DIASTOLIC BLOOD PRESSURE: 80 MMHG | SYSTOLIC BLOOD PRESSURE: 120 MMHG | TEMPERATURE: 98.7 F | HEART RATE: 100 BPM | WEIGHT: 178 LBS | HEIGHT: 64 IN | BODY MASS INDEX: 30.39 KG/M2 | OXYGEN SATURATION: 98 % | RESPIRATION RATE: 16 BRPM

## 2022-12-16 DIAGNOSIS — U07.1 COVID-19: ICD-10-CM

## 2022-12-16 DIAGNOSIS — B97.89 VIRAL RESPIRATORY ILLNESS: ICD-10-CM

## 2022-12-16 DIAGNOSIS — R05.1 ACUTE COUGH: ICD-10-CM

## 2022-12-16 DIAGNOSIS — J98.8 VIRAL RESPIRATORY ILLNESS: ICD-10-CM

## 2022-12-16 PROCEDURE — 99213 OFFICE O/P EST LOW 20 MIN: CPT | Performed by: PHYSICIAN ASSISTANT

## 2022-12-16 RX ORDER — NORETHINDRONE ACETATE AND ETHINYL ESTRADIOL, ETHINYL ESTRADIOL AND FERROUS FUMARATE 1MG-10(24)
KIT ORAL
COMMUNITY
Start: 2022-12-12

## 2022-12-16 RX ORDER — BENZONATATE 100 MG/1
100 CAPSULE ORAL 3 TIMES DAILY PRN
Qty: 60 CAPSULE | Refills: 0 | Status: SHIPPED | OUTPATIENT
Start: 2022-12-16 | End: 2023-03-10

## 2022-12-16 ASSESSMENT — ENCOUNTER SYMPTOMS
SORE THROAT: 1
EYE REDNESS: 0
DIARRHEA: 0
HEADACHES: 1
SHORTNESS OF BREATH: 1
COUGH: 1
NAUSEA: 0
FEVER: 1
EYE DISCHARGE: 0
VOMITING: 0

## 2022-12-16 ASSESSMENT — FIBROSIS 4 INDEX: FIB4 SCORE: 0.76

## 2022-12-16 NOTE — LETTER
December 16, 2022         Patient: Bobo Fry   YOB: 1980   Date of Visit: 12/16/2022           To Whom it May Concern:    Bobo Fry was seen in my clinic on 12/16/2022. Please excuse her from work 12/19, 12/20, and 12/21. She may return to work on 12/22/2022.    If you have any questions or concerns, please don't hesitate to call.        Sincerely,           Adelaida Wong P.A.-C.  Electronically Signed

## 2022-12-17 NOTE — PROGRESS NOTES
Subjective     Bobo Fry is a 42 y.o. female who presents with Otalgia (Tested positive on Monday for Covid), Congestion, Headache, and Chills (/)            URI   This is a new problem. Episode onset: x 6 days ago. The problem has been unchanged. The maximum temperature recorded prior to her arrival was 102 - 102.9 F. Associated symptoms include congestion, coughing, ear pain, headaches, joint pain, a plugged ear sensation and a sore throat. Pertinent negatives include no chest pain, diarrhea, nausea or vomiting. Treatments tried: OTC DayQuil and OTC Theraflu.     The patient states she took an at-home COVID-19 test on Monday, which was positive.    PMH:  has a past medical history of Anxiety, Anxiety and depression (4/20/2022), Arrhythmia, Encounter for other contraceptive management (11/19/2019), JERAMIE (generalized anxiety disorder) (4/20/2018), Heart murmur, Migraine, and Migraine without status migrainosus, not intractable (11/19/2019).    She has no past medical history of Allergy, Anemia, Arthritis, Asthma, Blood transfusion without reported diagnosis, Cancer ([facility]), Cataract, CHF (congestive heart failure) ([facility]), Clotting disorder ([facility]), COPD (chronic obstructive pulmonary disease) ([facility]), Diabetes ([facility]), Diabetic neuropathy ([facility]), GERD (gastroesophageal reflux disease), Glaucoma, Goiter, Heart attack ([facility]), HIV (human immunodeficiency virus infection) ([facility]), Hyperlipidemia, Hypertension, IBD (inflammatory bowel disease), Kidney disease, Meningitis, Pulmonary emphysema ([facility]), Seizure ([facility]), Stroke ([facility]), Substance abuse ([facility]), or Tuberculosis.  MEDS:   Current Outpatient Medications:     LO LOESTRIN FE 1 MG-10 MCG / 10 MCG Tab, , Disp: , Rfl:     FLUoxetine (PROZAC) 20 MG Cap, TAKE 1 CAPSULE BY MOUTH EVERY DAY, Disp: 90 Capsule, Rfl: 3    Multiple Vitamin (MULTIVITAMIN PO), Take 2 Each by mouth every day. Gummies, Disp: , Rfl:     Multiple Vitamins-Minerals (ZINC PO), Take 1 Tablet by mouth every  "day., Disp: , Rfl:     VITAMIN D PO, Take 1 Capsule by mouth every day., Disp: , Rfl:     COLLAGEN PO, Take 4 Capsules by mouth every day., Disp: , Rfl:     hydrOXYzine HCl (ATARAX) 25 MG Tab, Take 1 Tablet by mouth 3 times a day as needed for Anxiety., Disp: 30 Tablet, Rfl: 1  ALLERGIES: No Known Allergies  SURGHX:   Past Surgical History:   Procedure Laterality Date    DENTAL EXTRACTION(S)       SOCHX:  reports that she quit smoking about 22 years ago. Her smoking use included cigarettes. She has a 0.75 pack-year smoking history. She has never used smokeless tobacco. She reports that she does not drink alcohol and does not use drugs.  FH: Family history was reviewed, no pertinent findings to report      Review of Systems   Constitutional:  Positive for fever.   HENT:  Positive for congestion, ear pain and sore throat.    Eyes:  Negative for discharge and redness.   Respiratory:  Positive for cough and shortness of breath (The patient reports associated shortness of breath, which is worse with exertion.).    Cardiovascular:  Negative for chest pain and leg swelling.   Gastrointestinal:  Negative for diarrhea, nausea and vomiting.   Musculoskeletal:  Positive for joint pain.   Neurological:  Positive for headaches.            Objective     /80   Pulse 100   Temp 37.1 °C (98.7 °F) (Temporal)   Resp 16   Ht 1.626 m (5' 4\")   Wt 80.7 kg (178 lb)   SpO2 98%   BMI 30.55 kg/m²      Physical Exam  Constitutional:       General: She is not in acute distress.     Appearance: Normal appearance. She is not ill-appearing.   HENT:      Head: Normocephalic and atraumatic.      Right Ear: Tympanic membrane, ear canal and external ear normal.      Left Ear: Tympanic membrane, ear canal and external ear normal.      Nose: Nose normal.      Mouth/Throat:      Mouth: Mucous membranes are moist.      Pharynx: Oropharynx is clear. No posterior oropharyngeal erythema.   Eyes:      Extraocular Movements: Extraocular movements " intact.      Conjunctiva/sclera: Conjunctivae normal.   Cardiovascular:      Rate and Rhythm: Normal rate and regular rhythm.      Heart sounds: Normal heart sounds.   Pulmonary:      Effort: Pulmonary effort is normal. No respiratory distress.      Breath sounds: Normal breath sounds. No wheezing.   Musculoskeletal:         General: Normal range of motion.      Cervical back: Normal range of motion and neck supple.   Skin:     General: Skin is warm and dry.   Neurological:      Mental Status: She is alert and oriented to person, place, and time.                           Assessment & Plan          1. Viral respiratory illness    2. Acute cough  - benzonatate (TESSALON) 100 MG Cap; Take 1 Capsule by mouth 3 times a day as needed for Cough.  Dispense: 60 Capsule; Refill: 0    3. COVID-19    The patient's presenting symptoms and physical exam findings are consistent with a viral respiratory illness with an associated cough.  The patient recently tested positive for COVID-19.  The patient's physical exam today in clinic was normal.  The patient is nontoxic and appears in no acute distress.  The patient's vital signs are stable and within normal limits.  She is afebrile today in clinic.  Discussed likely viral etiology with the patient.  Informed the patient that her symptoms are consistent with COVID-19.  Advised the patient to monitor for worsening signs and or symptoms.  Will prescribe the patient Tessalon for symptomatic relief of her cough.  Recommend OTC medications and supportive care for symptomatic management.  Recommend the patient follow-up with her PCP as needed.  Discussed return precautions with the patient, and she verbalized understanding.    Differential diagnoses, supportive care, and indications for immediate follow-up discussed with patient.   Instructed to return to clinic or nearest emergency department for any change in condition, further concerns, or worsening of symptoms.    OTC Tylenol or  Motrin for fever/discomfort.  OTC cough/cold medication for symptomatic relief  OTC Supportive Care for Congestion - saline nasal spray or neti pot  Drink plenty of fluids  Follow-up with PCP  Return to clinic or go to the ED if symptoms worsen or fail to improve, or if the patient should develop worsening/increasing cough, congestion, ear pain, sore throat, shortness of breath, wheezing, chest pain, fever/chills, and/or any concerning symptoms.    Discussed plan with the patient, and she agrees to the above.     I personally reviewed prior external notes and test results pertinent to today's visit.  I have independently reviewed and interpreted all diagnostics ordered during this urgent care visit.     Please note that this dictation was created using voice recognition software. I have made every reasonable attempt to correct obvious errors, but I expect that there may be errors of grammar and possibly content that I did not discover before finalizing the note.     This note was electronically signed by Adelaida Wong PA-C

## 2023-01-31 ENCOUNTER — OFFICE VISIT (OUTPATIENT)
Dept: MEDICAL GROUP | Facility: PHYSICIAN GROUP | Age: 43
End: 2023-01-31
Payer: COMMERCIAL

## 2023-01-31 VITALS
HEART RATE: 98 BPM | DIASTOLIC BLOOD PRESSURE: 80 MMHG | TEMPERATURE: 98.6 F | OXYGEN SATURATION: 97 % | WEIGHT: 185.8 LBS | SYSTOLIC BLOOD PRESSURE: 130 MMHG | HEIGHT: 64 IN | BODY MASS INDEX: 31.72 KG/M2

## 2023-01-31 DIAGNOSIS — R07.89 OTHER CHEST PAIN: ICD-10-CM

## 2023-01-31 DIAGNOSIS — I10 PRIMARY HYPERTENSION: ICD-10-CM

## 2023-01-31 DIAGNOSIS — F32.A ANXIETY AND DEPRESSION: ICD-10-CM

## 2023-01-31 DIAGNOSIS — F41.9 ANXIETY AND DEPRESSION: ICD-10-CM

## 2023-01-31 PROCEDURE — 93000 ELECTROCARDIOGRAM COMPLETE: CPT | Performed by: NURSE PRACTITIONER

## 2023-01-31 PROCEDURE — 99214 OFFICE O/P EST MOD 30 MIN: CPT | Performed by: NURSE PRACTITIONER

## 2023-01-31 RX ORDER — LISINOPRIL 20 MG/1
20 TABLET ORAL DAILY
Qty: 30 TABLET | Refills: 0 | Status: SHIPPED | OUTPATIENT
Start: 2023-01-31 | End: 2023-02-22

## 2023-01-31 ASSESSMENT — ENCOUNTER SYMPTOMS
HEADACHES: 1
SHORTNESS OF BREATH: 0
COUGH: 0
PALPITATIONS: 1
FEVER: 0
CHILLS: 0
DIZZINESS: 0
DEPRESSION: 0
VOMITING: 0
BLURRED VISION: 0
HEARTBURN: 0
NAUSEA: 0
MYALGIAS: 0

## 2023-01-31 ASSESSMENT — PATIENT HEALTH QUESTIONNAIRE - PHQ9: CLINICAL INTERPRETATION OF PHQ2 SCORE: 0

## 2023-01-31 ASSESSMENT — FIBROSIS 4 INDEX: FIB4 SCORE: 0.76

## 2023-01-31 NOTE — ASSESSMENT & PLAN NOTE
- Start Lisinopril 20 mg, risks, benefits, side effects were discussed  -Patient will contact this provider if symptoms change or worsen  -Counseled regarding presenting to the ER for increasing or worsening symptoms.

## 2023-01-31 NOTE — PROGRESS NOTES
Subjective:     Chief Complaint   Patient presents with    Other     B/P Check       HPI:   Bobo presents today with     Problem   Primary Hypertension    This is a new issue. States has been having chest pain/pressure. States not happening right now. Highest BP has 160/114. States checking every couple days alternating between checking in the morning and the evening. Usually lowest in the morning. 132/84 in the am. 153/105 in the afternoon. States has been noticing rapid heart rate and chest soreness. initial elevated blood pressure was noted when she went to give blood. states she has been having headaches most of the time. States she has increased stress at work. States anxiety is overall well controlled with Prozac. States she is not having n/v, dizziness.     EKG Interpretation   Interpreted by me   Rhythm: normal sinus   Rate: normal   Conduction: normal   ST Segments: no acute change   T Waves: no acute change   Q Waves: none   Clinical Impression: no acute changes and normal EKG       Anxiety and Depression    Chronic in nature per patient.  Stable.  Patient states that she is taking the Prozac 20 mg by mouth daily.  She has had some increased stress at work which she does feel is affecting her blood pressure.           Current Outpatient Medications Ordered in Epic   Medication Sig Dispense Refill    lisinopril (PRINIVIL) 20 MG Tab Take 1 Tablet by mouth every day. 30 Tablet 0    LO LOESTRIN FE 1 MG-10 MCG / 10 MCG Tab       FLUoxetine (PROZAC) 20 MG Cap TAKE 1 CAPSULE BY MOUTH EVERY DAY 90 Capsule 3    Multiple Vitamin (MULTIVITAMIN PO) Take 2 Each by mouth every day. Gummies      Multiple Vitamins-Minerals (ZINC PO) Take 1 Tablet by mouth every day.      VITAMIN D PO Take 1 Capsule by mouth every day.      COLLAGEN PO Take 4 Capsules by mouth every day.      hydrOXYzine HCl (ATARAX) 25 MG Tab Take 1 Tablet by mouth 3 times a day as needed for Anxiety. 30 Tablet 1    benzonatate (TESSALON) 100 MG Cap  "Take 1 Capsule by mouth 3 times a day as needed for Cough. (Patient not taking: Reported on 1/31/2023) 60 Capsule 0     No current Epic-ordered facility-administered medications on file.       Health Maintenance: Defer to PCP    Review of Systems   Constitutional:  Negative for chills, fever and malaise/fatigue.   Eyes:  Negative for blurred vision.   Respiratory:  Negative for cough and shortness of breath.    Cardiovascular:  Positive for chest pain and palpitations. Negative for leg swelling.   Gastrointestinal:  Negative for heartburn, nausea and vomiting.   Musculoskeletal:  Negative for myalgias.   Neurological:  Positive for headaches. Negative for dizziness.   Psychiatric/Behavioral:  Negative for depression.      Patient does mention that her heart feels like it is racing prior to completing the EKG.  Objective:     Exam:  /80 (BP Location: Left arm, Patient Position: Sitting, BP Cuff Size: Adult)   Pulse 98   Temp 37 °C (98.6 °F) (Temporal)   Ht 1.626 m (5' 4\")   Wt 84.3 kg (185 lb 12.8 oz)   SpO2 97%   BMI 31.89 kg/m²  Body mass index is 31.89 kg/m².    Physical Exam  Constitutional:       Appearance: Normal appearance.   Cardiovascular:      Rate and Rhythm: Normal rate and regular rhythm.      Pulses: Normal pulses.      Heart sounds: Normal heart sounds.   Pulmonary:      Effort: Pulmonary effort is normal.      Breath sounds: Normal breath sounds.   Skin:     General: Skin is warm and dry.      Capillary Refill: Capillary refill takes less than 2 seconds.   Neurological:      General: No focal deficit present.      Mental Status: She is alert and oriented to person, place, and time.     Assessment & Plan:     42 y.o. female with the following -     Problem List Items Addressed This Visit       Anxiety and depression     - Continue Prozac 20 mg daily         Primary hypertension     - Start Lisinopril 20 mg, risks, benefits, side effects were discussed  -Patient will contact this provider " if symptoms change or worsen  -Counseled regarding presenting to the ER for increasing or worsening symptoms.         Relevant Medications    lisinopril (PRINIVIL) 20 MG Tab    Other Relevant Orders    EKG - Clinic Performed (Completed)     Other Visit Diagnoses       Other chest pain        Relevant Orders    EKG - Clinic Performed (Completed)            Return in about 1 month (around 2/28/2023), or if symptoms worsen or fail to improve, for With PCP for hypertension.    I have placed the below orders and discussed them with an approved delegating provider. The MA is performing the below orders under the direction of Dr. Arreola.     Please note that this dictation was created using voice recognition software. I have made every reasonable attempt to correct obvious errors, but I expect that there are errors of grammar and possibly content that I did not discover before finalizing the note.

## 2023-03-07 DIAGNOSIS — I10 PRIMARY HYPERTENSION: ICD-10-CM

## 2023-03-07 RX ORDER — LISINOPRIL 20 MG/1
20 TABLET ORAL DAILY
Qty: 90 TABLET | Refills: 2 | Status: SHIPPED | OUTPATIENT
Start: 2023-03-07 | End: 2023-03-07

## 2023-03-07 RX ORDER — OLMESARTAN MEDOXOMIL 20 MG/1
20 TABLET ORAL DAILY
Qty: 30 TABLET | Refills: 1 | Status: SHIPPED | OUTPATIENT
Start: 2023-03-07 | End: 2023-03-10 | Stop reason: SDUPTHER

## 2023-03-10 ENCOUNTER — OFFICE VISIT (OUTPATIENT)
Dept: MEDICAL GROUP | Facility: PHYSICIAN GROUP | Age: 43
End: 2023-03-10
Payer: COMMERCIAL

## 2023-03-10 VITALS
DIASTOLIC BLOOD PRESSURE: 70 MMHG | WEIGHT: 183.4 LBS | OXYGEN SATURATION: 98 % | BODY MASS INDEX: 31.31 KG/M2 | HEIGHT: 64 IN | TEMPERATURE: 98 F | HEART RATE: 86 BPM | SYSTOLIC BLOOD PRESSURE: 110 MMHG

## 2023-03-10 DIAGNOSIS — F41.9 ANXIETY AND DEPRESSION: ICD-10-CM

## 2023-03-10 DIAGNOSIS — Z13.6 SCREENING FOR CARDIOVASCULAR CONDITION: ICD-10-CM

## 2023-03-10 DIAGNOSIS — F32.A ANXIETY AND DEPRESSION: ICD-10-CM

## 2023-03-10 DIAGNOSIS — I10 PRIMARY HYPERTENSION: ICD-10-CM

## 2023-03-10 PROCEDURE — 99214 OFFICE O/P EST MOD 30 MIN: CPT

## 2023-03-10 RX ORDER — HYDROXYZINE HYDROCHLORIDE 25 MG/1
25 TABLET, FILM COATED ORAL 3 TIMES DAILY PRN
Qty: 30 TABLET | Refills: 1 | Status: SHIPPED | OUTPATIENT
Start: 2023-03-10

## 2023-03-10 RX ORDER — OLMESARTAN MEDOXOMIL 20 MG/1
20 TABLET ORAL DAILY
Qty: 90 TABLET | Refills: 2 | Status: SHIPPED | OUTPATIENT
Start: 2023-03-10 | End: 2024-01-16

## 2023-03-10 ASSESSMENT — ENCOUNTER SYMPTOMS
DIARRHEA: 0
BLURRED VISION: 0
COUGH: 0
SHORTNESS OF BREATH: 0
CONSTIPATION: 0
FEVER: 0
CHILLS: 0
DEPRESSION: 0
NERVOUS/ANXIOUS: 0
DIZZINESS: 0
WEIGHT LOSS: 0
WEAKNESS: 0
NAUSEA: 0
HEADACHES: 0
MYALGIAS: 0
ABDOMINAL PAIN: 0
VOMITING: 0

## 2023-03-10 ASSESSMENT — FIBROSIS 4 INDEX: FIB4 SCORE: 0.76

## 2023-03-10 NOTE — ASSESSMENT & PLAN NOTE
Chronic, controlled  -Continue olmesartan 20 mg daily as BP today is good at 110/70  -Continue home BP monitoring

## 2023-03-10 NOTE — PROGRESS NOTES
"Subjective:     CC: Blood pressure follow-up    HPI:   Bobo presents today with    Problem   Primary Hypertension    Likely chronic condition that went untreated for many months until exacerbation recently with chest pain.. BP has improved as well as CP symptoms. Did not tolerate lisinopril with side effect of cough. Home /80s. Feeling better.  Taking Olmesartan 20 mg without side effects.  EKG reviewed from last visit was normal without acute changes.  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     Anxiety and Depression    Chronic condition for which patient is taking prozac 20 mg daily.  Taking hydroxyzine 25 mg nightly if needed.  She is doing well with these and no side effects.  Refill needed for hydroxyzine.          Health Maintenance: Completed    ROS:  Review of Systems   Constitutional:  Negative for chills, fever, malaise/fatigue and weight loss.   Eyes:  Negative for blurred vision.   Respiratory:  Negative for cough and shortness of breath.    Cardiovascular:  Negative for chest pain.   Gastrointestinal:  Negative for abdominal pain, constipation, diarrhea, nausea and vomiting.   Musculoskeletal:  Negative for myalgias.   Neurological:  Negative for dizziness, weakness and headaches.   Psychiatric/Behavioral:  Negative for depression. The patient is not nervous/anxious.      Objective:     Exam:  /70 (BP Location: Left arm, Patient Position: Sitting, BP Cuff Size: Adult)   Pulse 86   Temp 36.7 °C (98 °F) (Temporal)   Ht 1.626 m (5' 4\")   Wt 83.2 kg (183 lb 6.4 oz)   SpO2 98%   BMI 31.48 kg/m²  Body mass index is 31.48 kg/m².    Physical Exam    Labs:   Lab Results   Component Value Date/Time    CHOLSTRLTOT 149 04/26/2022 07:02 AM    LDL 95 04/26/2022 07:02 AM    HDL 45 04/26/2022 07:02 AM    TRIGLYCERIDE 45 04/26/2022 07:02 AM       Lab Results   Component Value Date/Time    SODIUM 142 04/26/2022 07:02 AM    POTASSIUM 4.4 " 04/26/2022 07:02 AM    CHLORIDE 107 04/26/2022 07:02 AM    CO2 22 04/26/2022 07:02 AM    GLUCOSE 93 04/26/2022 07:02 AM    BUN 21 04/26/2022 07:02 AM    CREATININE 0.97 04/26/2022 07:02 AM     Lab Results   Component Value Date/Time    ALKPHOSPHAT 60 04/26/2022 07:02 AM    ASTSGOT 19 04/26/2022 07:02 AM    ALTSGPT 15 04/26/2022 07:02 AM    TBILIRUBIN 0.6 04/26/2022 07:02 AM      Lab Results   Component Value Date/Time    HBA1C 5.2 04/26/2022 07:02 AM     No results found for: TSH  No results found for: FREET4      Assessment & Plan: Medical Decision Making     42 y.o. female with the following -     Problem List Items Addressed This Visit       Anxiety and depression     Chronic stable condition therefore we will refill hydroxyzine 25 mg to take nightly if needed for insomnia.  Patient doing well on Prozac therefore will remain at 20 mg daily.  -ED precautions given unknown for any suicidal ideations.  National suicide hotline number is 988         Relevant Medications    hydrOXYzine HCl (ATARAX) 25 MG Tab    Other Relevant Orders    Lipid Profile    Comp Metabolic Panel    Primary hypertension     Chronic, controlled  -Continue olmesartan 20 mg daily as BP today is good at 110/70  -Continue home BP monitoring         Relevant Medications    olmesartan (BENICAR) 20 MG Tab    Other Relevant Orders    Lipid Profile    Comp Metabolic Panel     Other Visit Diagnoses       Screening for cardiovascular condition        Relevant Orders    Lipid Profile    Comp Metabolic Panel            Differential diagnosis, natural history, supportive care, and indications for immediate follow-up discussed.  Shared decision making approach utilized, and patient is amendable with plan of care.  Patient understands to return to clinic or go to the emergency department if symptoms worsen. All questions and concerns addressed to the best of my knowledge.    Return in about 6 months (around 9/10/2023) for F/U Lab Review.    Please note that  this dictation was created using voice recognition software. I have made every reasonable attempt to correct obvious errors, but I expect that there are errors of grammar and possibly content that I did not discover before finalizing the note.

## 2023-03-11 NOTE — ASSESSMENT & PLAN NOTE
Chronic stable condition therefore we will refill hydroxyzine 25 mg to take nightly if needed for insomnia.  Patient doing well on Prozac therefore will remain at 20 mg daily.  -ED precautions given unknown for any suicidal ideations.  National suicide hotline number is 988

## 2023-06-27 ENCOUNTER — OFFICE VISIT (OUTPATIENT)
Dept: MEDICAL GROUP | Facility: PHYSICIAN GROUP | Age: 43
End: 2023-06-27
Payer: COMMERCIAL

## 2023-06-27 VITALS
BODY MASS INDEX: 32.2 KG/M2 | SYSTOLIC BLOOD PRESSURE: 118 MMHG | DIASTOLIC BLOOD PRESSURE: 78 MMHG | RESPIRATION RATE: 16 BRPM | HEART RATE: 92 BPM | WEIGHT: 188.6 LBS | HEIGHT: 64 IN | OXYGEN SATURATION: 97 % | TEMPERATURE: 99.5 F

## 2023-06-27 DIAGNOSIS — F41.9 ANXIETY AND DEPRESSION: ICD-10-CM

## 2023-06-27 DIAGNOSIS — F32.A ANXIETY AND DEPRESSION: ICD-10-CM

## 2023-06-27 DIAGNOSIS — R00.2 PALPITATIONS: ICD-10-CM

## 2023-06-27 PROCEDURE — 3074F SYST BP LT 130 MM HG: CPT

## 2023-06-27 PROCEDURE — 3078F DIAST BP <80 MM HG: CPT

## 2023-06-27 PROCEDURE — 93000 ELECTROCARDIOGRAM COMPLETE: CPT

## 2023-06-27 PROCEDURE — 1126F AMNT PAIN NOTED NONE PRSNT: CPT

## 2023-06-27 PROCEDURE — 99214 OFFICE O/P EST MOD 30 MIN: CPT

## 2023-06-27 ASSESSMENT — ENCOUNTER SYMPTOMS
HEADACHES: 1
SHORTNESS OF BREATH: 0
BLURRED VISION: 0
ABDOMINAL PAIN: 0
WEAKNESS: 0
COUGH: 0
DIARRHEA: 0
NAUSEA: 1
NAUSEA: 0
MYALGIAS: 0
DIZZINESS: 1
CONSTIPATION: 0
WEIGHT LOSS: 0
VOMITING: 0
HEADACHES: 0
SHORTNESS OF BREATH: 1
CHILLS: 0
FEVER: 0
PALPITATIONS: 1

## 2023-06-27 ASSESSMENT — PAIN SCALES - GENERAL: PAINLEVEL: NO PAIN

## 2023-06-27 ASSESSMENT — FIBROSIS 4 INDEX: FIB4 SCORE: 0.76

## 2023-06-27 NOTE — PROGRESS NOTES
"Subjective     Bobo Fry is a 42 y.o. female who presents with chronic palpitations and chest \"pinching.\" She states these episodes have been going on for several months, states there is random moments when her heart rate will spike to 130-150, not associated with any activity. Reports she feels the palpitations and her apple watch notifies her of an irregular and fast rhythm simultaneously. Episodes are becoming more frequent since onset, lastly anywhere from a few seconds to a few minutes. Has never seen a cardiologist, reports a family history of irregular heart rhythms. Last EKG was on 1/2023 for similar episodes. Patient reports now she has associated symptoms of nausea, dizziness, headaches and hot flashes.     Other  Associated symptoms include headaches and nausea. Pertinent negatives include no abdominal pain, chest pain, chills, coughing, fever or vomiting.       Review of Systems   Constitutional:  Negative for chills, fever, malaise/fatigue and weight loss.   Respiratory:  Negative for cough and shortness of breath.    Cardiovascular:  Positive for palpitations. Negative for chest pain.   Gastrointestinal:  Positive for nausea. Negative for abdominal pain, constipation, diarrhea and vomiting.   Neurological:  Positive for dizziness and headaches.       Objective     /78 (BP Location: Left arm, Patient Position: Sitting, BP Cuff Size: Large adult)   Pulse 92   Temp 37.5 °C (99.5 °F) (Temporal)   Resp 16   Ht 1.626 m (5' 4\")   Wt 85.5 kg (188 lb 9.6 oz)   LMP  (LMP Unknown)   SpO2 97%   BMI 32.37 kg/m²      Physical Exam  Constitutional:       General: She is not in acute distress.     Appearance: Normal appearance.   HENT:      Head: Normocephalic and atraumatic.   Cardiovascular:      Rate and Rhythm: Normal rate and regular rhythm.      Pulses: Normal pulses.      Heart sounds: Normal heart sounds. No murmur heard.  Pulmonary:      Effort: Pulmonary effort is normal. No " respiratory distress.      Breath sounds: Normal breath sounds. No wheezing.   Abdominal:      General: Abdomen is flat.      Palpations: Abdomen is soft.   Neurological:      Mental Status: She is alert.   Psychiatric:         Mood and Affect: Mood normal.         Behavior: Behavior normal.         Assessment & Plan     1. Palpitations  Chronic condition of unknown prognosis   -EKG in office today shows sinus tachycardia, no evidence of ST elevation or depression   -Will obtain labs and refer to cardiology   -Strict ED precautions given for severe chest pain and/or shortness of breath   - CBC WITH DIFFERENTIAL; Future  - TSH WITH REFLEX TO FT4; Future  - REFERRAL TO CARDIOLOGY    2. Anxiety and depression  Chronic, stable   -Continue taking Prozac 20mg daily and Hydroxyzine 25mg PRN.   -Recommend stress reduction techniques including but not limited to yoga, mediation, exercise

## 2023-06-28 NOTE — PROGRESS NOTES
"Subjective:     CC: Palpitations    HPI:   Bobo presents today with    Problem   Palpitations    -Chronic, ongoing for several months  -Episodic palpitations, patient reports her apple watch notifying her of a heart rate in the 130-150s, not associated with any activity   -Episodes are now becoming more frequent and associated with dizziness, nausea, headaches and hot flashes.   -Denies any chest pain or shortness of breath, reports she gets \"chest pinching.\"  -She had an EKG in January of 2023 for similar symptoms. Reports a family history of irregular heart rhythms.  -Reports to suffer from seasonal allergies and has been taking \"non-drowsy\" generic allergy relief medication from [employer]  -Reports to drink 32-40 oz of water per day     Anxiety and Depression    Chronic condition for which patient is taking Prozac 20 mg daily with Hydroxyzine 25mg tablet PRN. Reports she is doing well on these medication with no side effects.          Health Maintenance: Recommend follow-up for health maintenance topics      ROS:  Review of Systems   Constitutional:  Negative for chills, fever, malaise/fatigue and weight loss.   Eyes:  Negative for blurred vision.   Respiratory:  Positive for shortness of breath. Negative for cough.    Cardiovascular:  Positive for chest pain and palpitations.   Gastrointestinal:  Negative for abdominal pain, constipation, diarrhea, nausea and vomiting.   Musculoskeletal:  Negative for myalgias.   Neurological:  Positive for dizziness. Negative for weakness and headaches.       Objective:     Exam:  /78 (BP Location: Left arm, Patient Position: Sitting, BP Cuff Size: Large adult)   Pulse 92   Temp 37.5 °C (99.5 °F) (Temporal)   Resp 16   Ht 1.626 m (5' 4\")   Wt 85.5 kg (188 lb 9.6 oz)   LMP  (LMP Unknown)   SpO2 97%   BMI 32.37 kg/m²  Body mass index is 32.37 kg/m².    Physical Exam  Constitutional:       General: She is not in acute distress.     Appearance: Normal appearance. She " is not ill-appearing or toxic-appearing.   HENT:      Head: Normocephalic.   Eyes:      Conjunctiva/sclera: Conjunctivae normal.   Cardiovascular:      Rate and Rhythm: Normal rate and regular rhythm.      Pulses: Normal pulses.      Heart sounds: Normal heart sounds. No murmur heard.  Pulmonary:      Effort: Pulmonary effort is normal. No respiratory distress.      Breath sounds: Normal breath sounds.   Skin:     General: Skin is warm and dry.   Neurological:      General: No focal deficit present.      Mental Status: She is alert and oriented to person, place, and time.   Psychiatric:         Mood and Affect: Mood normal.         Behavior: Behavior normal.         Labs:   Lab Results   Component Value Date/Time    CHOLSTRLTOT 149 04/26/2022 07:02 AM    LDL 95 04/26/2022 07:02 AM    HDL 45 04/26/2022 07:02 AM    TRIGLYCERIDE 45 04/26/2022 07:02 AM       Lab Results   Component Value Date/Time    SODIUM 142 04/26/2022 07:02 AM    POTASSIUM 4.4 04/26/2022 07:02 AM    CHLORIDE 107 04/26/2022 07:02 AM    CO2 22 04/26/2022 07:02 AM    GLUCOSE 93 04/26/2022 07:02 AM    BUN 21 04/26/2022 07:02 AM    CREATININE 0.97 04/26/2022 07:02 AM     Lab Results   Component Value Date/Time    ALKPHOSPHAT 60 04/26/2022 07:02 AM    ASTSGOT 19 04/26/2022 07:02 AM    ALTSGPT 15 04/26/2022 07:02 AM    TBILIRUBIN 0.6 04/26/2022 07:02 AM      Lab Results   Component Value Date/Time    HBA1C 5.2 04/26/2022 07:02 AM     No results found for: TSH  No results found for: FREET4  No results found for: CBC      Assessment & Plan: Medical Decision Making     42 y.o. female with the following -     Problem List Items Addressed This Visit       Anxiety and depression     Chronic stable condition therefore we will continue current dose of Prozac 20 mg daily         Palpitations     Chronic-uncontrolled  -Given symptomology has progressed will refer to cardiology as discussed  -ED precautions given and known for worsening or progression of condition  including any sustained level of rapid heartbeat along with chest pain or shortness of breath  -Strongly recommend localized allergy relief such as NeilMed sinus rinse followed by Flonase nasal spray as discussed.  Have recommended patient stop using stimulant/nondrowsy allergy formula as this could be increasing her heart rate  -Strongly recommend drinking at least 64 ounces of water per day  -  EKG Interpretation   Ordered and interpreted by SLOAN Wylie  Rhythm: Sinus tachycardia   Rate: 101-sinus tach  Axis: normal   Ectopy: none   Conduction: normal   ST Segments: no acute change   T Waves: no acute change   Q Waves: none   Clinical Impression: no acute changes and otherwise normal EKG             Relevant Orders    CBC WITH DIFFERENTIAL    TSH WITH REFLEX TO FT4    REFERRAL TO CARDIOLOGY         Differential diagnosis, natural history, supportive care, and indications for immediate follow-up discussed.  Shared decision making approach utilized, and patient is amendable with plan of care.  Patient understands to return to clinic or go to the emergency department if symptoms worsen. All questions and concerns addressed to the best of my knowledge.    Return if symptoms worsen or fail to improve.    Please note that this dictation was created using voice recognition software. I have made every reasonable attempt to correct obvious errors, but I expect that there are errors of grammar and possibly content that I did not discover before finalizing the note.

## 2023-06-28 NOTE — ASSESSMENT & PLAN NOTE
Chronic-uncontrolled  -Given symptomology has progressed will refer to cardiology as discussed  -ED precautions given and known for worsening or progression of condition including any sustained level of rapid heartbeat along with chest pain or shortness of breath  -Strongly recommend localized allergy relief such as NeilMed sinus rinse followed by Flonase nasal spray as discussed.  Have recommended patient stop using stimulant/nondrowsy allergy formula as this could be increasing her heart rate  -Strongly recommend drinking at least 64 ounces of water per day  -  EKG Interpretation   Ordered and interpreted by SLOAN Wylie  Rhythm: Sinus tachycardia   Rate: 101-sinus tach  Axis: normal   Ectopy: none   Conduction: normal   ST Segments: no acute change   T Waves: no acute change   Q Waves: none   Clinical Impression: no acute changes and otherwise normal EKG

## 2023-07-05 ENCOUNTER — TELEPHONE (OUTPATIENT)
Dept: HEALTH INFORMATION MANAGEMENT | Facility: OTHER | Age: 43
End: 2023-07-05
Payer: COMMERCIAL

## 2023-07-13 ENCOUNTER — HOSPITAL ENCOUNTER (OUTPATIENT)
Dept: LAB | Facility: MEDICAL CENTER | Age: 43
End: 2023-07-13
Payer: COMMERCIAL

## 2023-07-13 DIAGNOSIS — Z13.6 SCREENING FOR CARDIOVASCULAR CONDITION: ICD-10-CM

## 2023-07-13 DIAGNOSIS — F41.9 ANXIETY AND DEPRESSION: ICD-10-CM

## 2023-07-13 DIAGNOSIS — I10 PRIMARY HYPERTENSION: ICD-10-CM

## 2023-07-13 DIAGNOSIS — F32.A ANXIETY AND DEPRESSION: ICD-10-CM

## 2023-07-13 DIAGNOSIS — R00.2 PALPITATIONS: ICD-10-CM

## 2023-07-13 LAB
ALBUMIN SERPL BCP-MCNC: 4.6 G/DL (ref 3.2–4.9)
ALBUMIN/GLOB SERPL: 1.8 G/DL
ALP SERPL-CCNC: 75 U/L (ref 30–99)
ALT SERPL-CCNC: 22 U/L (ref 2–50)
ANION GAP SERPL CALC-SCNC: 14 MMOL/L (ref 7–16)
AST SERPL-CCNC: 21 U/L (ref 12–45)
BASOPHILS # BLD AUTO: 0.8 % (ref 0–1.8)
BASOPHILS # BLD: 0.07 K/UL (ref 0–0.12)
BILIRUB SERPL-MCNC: 0.3 MG/DL (ref 0.1–1.5)
BUN SERPL-MCNC: 16 MG/DL (ref 8–22)
CALCIUM ALBUM COR SERPL-MCNC: 8.9 MG/DL (ref 8.5–10.5)
CALCIUM SERPL-MCNC: 9.4 MG/DL (ref 8.5–10.5)
CHLORIDE SERPL-SCNC: 103 MMOL/L (ref 96–112)
CHOLEST SERPL-MCNC: 206 MG/DL (ref 100–199)
CO2 SERPL-SCNC: 23 MMOL/L (ref 20–33)
CREAT SERPL-MCNC: 0.83 MG/DL (ref 0.5–1.4)
EOSINOPHIL # BLD AUTO: 0.28 K/UL (ref 0–0.51)
EOSINOPHIL NFR BLD: 3 % (ref 0–6.9)
ERYTHROCYTE [DISTWIDTH] IN BLOOD BY AUTOMATED COUNT: 43.6 FL (ref 35.9–50)
FASTING STATUS PATIENT QL REPORTED: NORMAL
GFR SERPLBLD CREATININE-BSD FMLA CKD-EPI: 90 ML/MIN/1.73 M 2
GLOBULIN SER CALC-MCNC: 2.5 G/DL (ref 1.9–3.5)
GLUCOSE SERPL-MCNC: 83 MG/DL (ref 65–99)
HCT VFR BLD AUTO: 43.2 % (ref 37–47)
HDLC SERPL-MCNC: 56 MG/DL
HGB BLD-MCNC: 14.4 G/DL (ref 12–16)
IMM GRANULOCYTES # BLD AUTO: 0.02 K/UL (ref 0–0.11)
IMM GRANULOCYTES NFR BLD AUTO: 0.2 % (ref 0–0.9)
LDLC SERPL CALC-MCNC: 130 MG/DL
LYMPHOCYTES # BLD AUTO: 3.02 K/UL (ref 1–4.8)
LYMPHOCYTES NFR BLD: 32.6 % (ref 22–41)
MCH RBC QN AUTO: 31.1 PG (ref 27–33)
MCHC RBC AUTO-ENTMCNC: 33.3 G/DL (ref 32.2–35.5)
MCV RBC AUTO: 93.3 FL (ref 81.4–97.8)
MONOCYTES # BLD AUTO: 0.54 K/UL (ref 0–0.85)
MONOCYTES NFR BLD AUTO: 5.8 % (ref 0–13.4)
NEUTROPHILS # BLD AUTO: 5.34 K/UL (ref 1.82–7.42)
NEUTROPHILS NFR BLD: 57.6 % (ref 44–72)
NRBC # BLD AUTO: 0 K/UL
NRBC BLD-RTO: 0 /100 WBC (ref 0–0.2)
PLATELET # BLD AUTO: 382 K/UL (ref 164–446)
PMV BLD AUTO: 8.5 FL (ref 9–12.9)
POTASSIUM SERPL-SCNC: 4.3 MMOL/L (ref 3.6–5.5)
PROT SERPL-MCNC: 7.1 G/DL (ref 6–8.2)
RBC # BLD AUTO: 4.63 M/UL (ref 4.2–5.4)
SODIUM SERPL-SCNC: 140 MMOL/L (ref 135–145)
TRIGL SERPL-MCNC: 99 MG/DL (ref 0–149)
TSH SERPL DL<=0.005 MIU/L-ACNC: 0.57 UIU/ML (ref 0.38–5.33)
WBC # BLD AUTO: 9.3 K/UL (ref 4.8–10.8)

## 2023-07-13 PROCEDURE — 80061 LIPID PANEL: CPT

## 2023-07-13 PROCEDURE — 85025 COMPLETE CBC W/AUTO DIFF WBC: CPT

## 2023-07-13 PROCEDURE — 84443 ASSAY THYROID STIM HORMONE: CPT

## 2023-07-13 PROCEDURE — 80053 COMPREHEN METABOLIC PANEL: CPT

## 2023-07-13 PROCEDURE — 36415 COLL VENOUS BLD VENIPUNCTURE: CPT

## 2023-07-14 DIAGNOSIS — R00.2 PALPITATIONS: ICD-10-CM

## 2023-09-29 ENCOUNTER — APPOINTMENT (OUTPATIENT)
Dept: SPORTS MEDICINE | Facility: CLINIC | Age: 43
End: 2023-09-29
Payer: COMMERCIAL

## 2024-05-11 DIAGNOSIS — F41.9 ANXIETY AND DEPRESSION: ICD-10-CM

## 2024-05-11 DIAGNOSIS — F32.A ANXIETY AND DEPRESSION: ICD-10-CM

## 2024-05-13 RX ORDER — FLUOXETINE HYDROCHLORIDE 20 MG/1
20 CAPSULE ORAL DAILY
Qty: 90 CAPSULE | Refills: 0 | Status: SHIPPED | OUTPATIENT
Start: 2024-05-13

## 2024-07-12 RX ORDER — OLMESARTAN MEDOXOMIL 20 MG/1
20 TABLET ORAL DAILY
Qty: 90 TABLET | Refills: 0 | Status: SHIPPED | OUTPATIENT
Start: 2024-07-12

## 2024-08-08 DIAGNOSIS — F32.A ANXIETY AND DEPRESSION: ICD-10-CM

## 2024-08-08 DIAGNOSIS — F41.9 ANXIETY AND DEPRESSION: ICD-10-CM

## 2024-08-08 NOTE — TELEPHONE ENCOUNTER
Received request via: Pharmacy    Was the patient seen in the last year in this department? Yes    Does the patient have an active prescription (recently filled or refills available) for medication(s) requested? No    Pharmacy Name: [facility] [place]    Does the patient have skilled nursing Plus and need 100-day supply? (This applies to ALL medications) Patient does not have SCP

## 2024-10-14 RX ORDER — OLMESARTAN MEDOXOMIL 20 MG/1
20 TABLET ORAL DAILY
Qty: 90 TABLET | Refills: 0 | Status: SHIPPED | OUTPATIENT
Start: 2024-10-14

## 2024-11-02 DIAGNOSIS — F32.A ANXIETY AND DEPRESSION: ICD-10-CM

## 2024-11-02 DIAGNOSIS — F41.9 ANXIETY AND DEPRESSION: ICD-10-CM

## 2024-11-04 ENCOUNTER — HOSPITAL ENCOUNTER (OUTPATIENT)
Dept: RADIOLOGY | Facility: MEDICAL CENTER | Age: 44
End: 2024-11-04
Payer: COMMERCIAL

## 2024-11-04 DIAGNOSIS — Z12.31 VISIT FOR SCREENING MAMMOGRAM: ICD-10-CM

## 2024-11-04 PROCEDURE — 77067 SCR MAMMO BI INCL CAD: CPT

## 2024-11-04 NOTE — TELEPHONE ENCOUNTER
Received request via: Pharmacy    Was the patient seen in the last year in this department? No    Does the patient have an active prescription (recently filled or refills available) for medication(s) requested? No    [facility]     Does the patient have intermediate Plus and need 100-day supply? (This applies to ALL medications) Patient does not have SCP

## 2024-11-18 ENCOUNTER — OFFICE VISIT (OUTPATIENT)
Dept: URGENT CARE | Facility: PHYSICIAN GROUP | Age: 44
End: 2024-11-18
Payer: COMMERCIAL

## 2024-11-18 VITALS
WEIGHT: 197 LBS | DIASTOLIC BLOOD PRESSURE: 80 MMHG | RESPIRATION RATE: 16 BRPM | BODY MASS INDEX: 33.81 KG/M2 | OXYGEN SATURATION: 97 % | HEART RATE: 102 BPM | TEMPERATURE: 98.2 F | SYSTOLIC BLOOD PRESSURE: 100 MMHG

## 2024-11-18 DIAGNOSIS — J01.90 ACUTE BACTERIAL SINUSITIS: ICD-10-CM

## 2024-11-18 DIAGNOSIS — B96.89 ACUTE BACTERIAL SINUSITIS: ICD-10-CM

## 2024-11-18 PROCEDURE — 99213 OFFICE O/P EST LOW 20 MIN: CPT

## 2024-11-18 PROCEDURE — 3079F DIAST BP 80-89 MM HG: CPT

## 2024-11-18 PROCEDURE — 3074F SYST BP LT 130 MM HG: CPT

## 2024-11-18 RX ORDER — METHYLPREDNISOLONE 4 MG/1
TABLET ORAL
Qty: 21 TABLET | Refills: 0 | Status: SHIPPED | OUTPATIENT
Start: 2024-11-18

## 2024-11-18 ASSESSMENT — ENCOUNTER SYMPTOMS
DIZZINESS: 1
HEADACHES: 1
SINUS PAIN: 1

## 2024-11-18 ASSESSMENT — FIBROSIS 4 INDEX: FIB4 SCORE: 0.49

## 2024-11-19 NOTE — PROGRESS NOTES
Verbal consent was acquired by the patient to use [employer] ambient listening note generation during this visit   Subjective:   Bobo Fry is a 43 y.o. female who presents for Headache (Dizziness for a week, sinus pressure, drainage in throat, eye puffy. )      HPI:  History of Present Illness  The patient is a 43-year-old female who presents for evaluation of dizziness, headaches, ear pain, and sinus pain.    She has been experiencing dizziness, headaches, ear pain, and sinus pain for the past week. Despite trying Mucinex and Sudafed, her symptoms have not improved. She reports no fever, but does experience hot and cold sensations. She also mentions facial pain, postnasal drip, and significant nasal drainage. Her eyes feel swollen and her ears are painful. She experiences lightheadedness and dizziness regardless of whether she is standing or sitting, which is a new symptom for her.         Review of Systems   HENT:  Positive for congestion, ear pain and sinus pain.    Neurological:  Positive for dizziness and headaches.       Medications:    Current Outpatient Medications on File Prior to Visit   Medication Sig Dispense Refill    olmesartan (BENICAR) 20 MG Tab TAKE 1 TABLET BY MOUTH EVERY DAY 90 Tablet 0    LO LOESTRIN FE 1 MG-10 MCG / 10 MCG Tab       Multiple Vitamin (MULTIVITAMIN PO) Take 2 Each by mouth every day. Gummies      Multiple Vitamins-Minerals (ZINC PO) Take 1 Tablet by mouth every day.      VITAMIN D PO Take 1 Capsule by mouth every day.      COLLAGEN PO Take 4 Capsules by mouth every day.      FLUoxetine (PROZAC) 20 MG Cap TAKE 1 CAPSULE BY MOUTH EVERY DAY 90 Capsule 0    hydrOXYzine HCl (ATARAX) 25 MG Tab Take 1 Tablet by mouth 3 times a day as needed for Anxiety. 30 Tablet 1     No current facility-administered medications on file prior to visit.        Allergies:   Patient has no known allergies.    Problem List:   Patient Active Problem List   Diagnosis    Encounter for other  contraceptive management    Migraine without status migrainosus, not intractable    Wellness examination    Irregular menstrual bleeding    Anxiety and depression    Primary hypertension    Palpitations        Surgical History:  Past Surgical History:   Procedure Laterality Date    DENTAL EXTRACTION(S)         Past Social Hx:   Social History     Tobacco Use    Smoking status: Former     Current packs/day: 0.00     Average packs/day: 0.3 packs/day for 3.0 years (0.8 ttl pk-yrs)     Types: Cigarettes     Start date: 1997     Quit date: 2000     Years since quittin.0    Smokeless tobacco: Never   Vaping Use    Vaping status: Never Used   Substance Use Topics    Alcohol use: No     Alcohol/week: 0.0 oz     Comment: one/month    Drug use: No          Problem list, medications, and allergies reviewed by myself today in Epic.     Objective:     /80   Pulse (!) 102   Temp 36.8 °C (98.2 °F) (Temporal)   Resp 16   Wt 89.4 kg (197 lb)   SpO2 97%   BMI 33.81 kg/m²     Physical Exam  Vitals and nursing note reviewed.   Constitutional:       General: She is not in acute distress.     Appearance: Normal appearance. She is normal weight. She is not ill-appearing, toxic-appearing or diaphoretic.   HENT:      Head: Normocephalic and atraumatic.      Right Ear: Tympanic membrane, ear canal and external ear normal. There is no impacted cerumen.      Left Ear: Tympanic membrane, ear canal and external ear normal. There is no impacted cerumen.      Nose: Nose normal. No congestion or rhinorrhea.      Mouth/Throat:      Mouth: Mucous membranes are moist.      Pharynx: Oropharynx is clear. No oropharyngeal exudate or posterior oropharyngeal erythema.   Cardiovascular:      Rate and Rhythm: Normal rate and regular rhythm.      Pulses: Normal pulses.      Heart sounds: Normal heart sounds. No murmur heard.     No friction rub. No gallop.   Pulmonary:      Effort: Pulmonary effort is normal. No respiratory  distress.      Breath sounds: Normal breath sounds. No stridor. No wheezing, rhonchi or rales.   Chest:      Chest wall: No tenderness.   Musculoskeletal:      Cervical back: Neck supple. No tenderness.   Lymphadenopathy:      Cervical: No cervical adenopathy.   Skin:     General: Skin is warm and dry.      Capillary Refill: Capillary refill takes less than 2 seconds.   Neurological:      General: No focal deficit present.      Mental Status: She is alert and oriented to person, place, and time. Mental status is at baseline.      Cranial Nerves: No cranial nerve deficit.      Motor: No weakness.      Gait: Gait normal.   Psychiatric:         Mood and Affect: Mood normal.         Behavior: Behavior normal.         Thought Content: Thought content normal.         Judgment: Judgment normal.         Assessment/Plan:     Diagnosis and associated orders:   1. Acute bacterial sinusitis  - amoxicillin-clavulanate (AUGMENTIN) 875-125 MG Tab; Take 1 Tablet by mouth 2 times a day for 7 days.  Dispense: 14 Tablet; Refill: 0  - methylPREDNISolone (MEDROL DOSEPAK) 4 MG Tablet Therapy Pack; Follow schedule on package instructions.  Dispense: 21 Tablet; Refill: 0        Comments/MDM:   Pt is clinically stable at today's acute urgent care visit.  No acute distress noted. Appropriate for outpatient management at this time.     Assessment & Plan  1. Sinus Infection.  The patient presents with dizziness, headaches, ear pain, sinus pain, facial pain, postnasal drip, and swollen eyes for the past week. She has tried Mucinex and Sudafed without improvement. There is no fever, but she experiences hot and cold sensations. Examination reveals symptoms consistent with a sinus infection. A 7-day course of Augmentin has been prescribed. Additionally, a Medrol Dosepak is provided to help with acute inflammation. The use of an antihistamine such as Claritin or Zyrtec is recommended to help dry up the fluid. Hydration is emphasized. If symptoms  worsen, she should return for further evaluation.                Discussed DDx, management options (risks,benefits, and alternatives to planned treatment), natural progression and supportive care.  Expressed understanding and the treatment plan was agreed upon. Questions were encouraged and answered   Return to urgent care prn if new or worsening sx or if there is no improvement in condition prn.    Educated in Red flags and indications to immediately call 911 or present to the Emergency Department.   Advised the patient to follow-up with the primary care physician for recheck, reevaluation, and consideration of further management.    I personally reviewed prior external notes and test results pertinent to today's visit.  I have independently reviewed and interpreted all diagnostics ordered during this urgent care acute visit.       Please note that this dictation was created using voice recognition software. I have made a reasonable attempt to correct obvious errors, but I expect that there are errors of grammar and possibly content that I did not discover before finalizing the note.    This note was electronically signed by SLOAN Killian

## 2025-01-11 NOTE — TELEPHONE ENCOUNTER
Received request via: Pharmacy    Was the patient seen in the last year in this department? No    Does the patient have an active prescription (recently filled or refills available) for medication(s) requested? No    Pharmacy Name:  [facility]/pharmacy #[id number] - [place], [place] - [address]     Does the patient have half-way Plus and need 100-day supply? (This applies to ALL medications) Patient does not have SCP

## 2025-01-12 RX ORDER — OLMESARTAN MEDOXOMIL 20 MG/1
20 TABLET ORAL DAILY
Qty: 90 TABLET | Refills: 0 | Status: SHIPPED | OUTPATIENT
Start: 2025-01-12

## 2025-05-22 RX ORDER — OLMESARTAN MEDOXOMIL 20 MG/1
20 TABLET ORAL DAILY
Qty: 60 TABLET | Refills: 0 | Status: SHIPPED | OUTPATIENT
Start: 2025-05-22

## 2025-05-22 NOTE — TELEPHONE ENCOUNTER
Received request via: Pharmacy    Was the patient seen in the last year in this department? Yes    Does the patient have an active prescription (recently filled or refills available) for medication(s) requested? No    Pharmacy Name: [facility] [place]    Does the patient have intermediate Plus and need 100-day supply? (This applies to ALL medications) Patient does not have SCP

## 2025-06-16 RX ORDER — OLMESARTAN MEDOXOMIL 20 MG/1
20 TABLET ORAL DAILY
Qty: 90 TABLET | Refills: 0 | Status: SHIPPED | OUTPATIENT
Start: 2025-06-16

## 2025-06-16 NOTE — TELEPHONE ENCOUNTER
Received request via: Pharmacy    Was the patient seen in the last year in this department? No LAST SEEN 06/27/2023    Does the patient have an active prescription (recently filled or refills available) for medication(s) requested? No    Pharmacy Name: [facility]    Does the patient have longterm Plus and need 100-day supply? (This applies to ALL medications) Patient does not have SCP

## 2025-07-26 SDOH — HEALTH STABILITY: PHYSICAL HEALTH: ON AVERAGE, HOW MANY MINUTES DO YOU ENGAGE IN EXERCISE AT THIS LEVEL?: 50 MIN

## 2025-07-26 SDOH — ECONOMIC STABILITY: INCOME INSECURITY: IN THE LAST 12 MONTHS, WAS THERE A TIME WHEN YOU WERE NOT ABLE TO PAY THE MORTGAGE OR RENT ON TIME?: NO

## 2025-07-26 SDOH — HEALTH STABILITY: MENTAL HEALTH
STRESS IS WHEN SOMEONE FEELS TENSE, NERVOUS, ANXIOUS, OR CAN'T SLEEP AT NIGHT BECAUSE THEIR MIND IS TROUBLED. HOW STRESSED ARE YOU?: ONLY A LITTLE

## 2025-07-26 SDOH — ECONOMIC STABILITY: INCOME INSECURITY: HOW HARD IS IT FOR YOU TO PAY FOR THE VERY BASICS LIKE FOOD, HOUSING, MEDICAL CARE, AND HEATING?: NOT HARD AT ALL

## 2025-07-26 SDOH — ECONOMIC STABILITY: FOOD INSECURITY: WITHIN THE PAST 12 MONTHS, THE FOOD YOU BOUGHT JUST DIDN'T LAST AND YOU DIDN'T HAVE MONEY TO GET MORE.: NEVER TRUE

## 2025-07-26 SDOH — ECONOMIC STABILITY: FOOD INSECURITY: WITHIN THE PAST 12 MONTHS, YOU WORRIED THAT YOUR FOOD WOULD RUN OUT BEFORE YOU GOT MONEY TO BUY MORE.: NEVER TRUE

## 2025-07-26 SDOH — HEALTH STABILITY: PHYSICAL HEALTH: ON AVERAGE, HOW MANY DAYS PER WEEK DO YOU ENGAGE IN MODERATE TO STRENUOUS EXERCISE (LIKE A BRISK WALK)?: 3 DAYS

## 2025-07-26 ASSESSMENT — SOCIAL DETERMINANTS OF HEALTH (SDOH)
HOW HARD IS IT FOR YOU TO PAY FOR THE VERY BASICS LIKE FOOD, HOUSING, MEDICAL CARE, AND HEATING?: NOT HARD AT ALL
WITHIN THE PAST 12 MONTHS, YOU WORRIED THAT YOUR FOOD WOULD RUN OUT BEFORE YOU GOT THE MONEY TO BUY MORE: NEVER TRUE
HOW OFTEN DO YOU HAVE A DRINK CONTAINING ALCOHOL: MONTHLY OR LESS
HOW OFTEN DO YOU ATTENT MEETINGS OF THE CLUB OR ORGANIZATION YOU BELONG TO?: NEVER
DO YOU BELONG TO ANY CLUBS OR ORGANIZATIONS SUCH AS CHURCH GROUPS UNIONS, FRATERNAL OR ATHLETIC GROUPS, OR SCHOOL GROUPS?: NO
DO YOU BELONG TO ANY CLUBS OR ORGANIZATIONS SUCH AS CHURCH GROUPS UNIONS, FRATERNAL OR ATHLETIC GROUPS, OR SCHOOL GROUPS?: NO
HOW OFTEN DO YOU HAVE SIX OR MORE DRINKS ON ONE OCCASION: NEVER
HOW OFTEN DO YOU GET TOGETHER WITH FRIENDS OR RELATIVES?: ONCE A WEEK
HOW OFTEN DO YOU GET TOGETHER WITH FRIENDS OR RELATIVES?: ONCE A WEEK
HOW OFTEN DO YOU ATTEND CHURCH OR RELIGIOUS SERVICES?: NEVER
HOW OFTEN DO YOU ATTENT MEETINGS OF THE CLUB OR ORGANIZATION YOU BELONG TO?: NEVER
IN A TYPICAL WEEK, HOW MANY TIMES DO YOU TALK ON THE PHONE WITH FAMILY, FRIENDS, OR NEIGHBORS?: MORE THAN THREE TIMES A WEEK
IN A TYPICAL WEEK, HOW MANY TIMES DO YOU TALK ON THE PHONE WITH FAMILY, FRIENDS, OR NEIGHBORS?: MORE THAN THREE TIMES A WEEK
IN THE PAST 12 MONTHS, HAS THE ELECTRIC, GAS, OIL, OR WATER COMPANY THREATENED TO SHUT OFF SERVICE IN YOUR HOME?: NO
HOW OFTEN DO YOU ATTEND CHURCH OR RELIGIOUS SERVICES?: NEVER
HOW MANY DRINKS CONTAINING ALCOHOL DO YOU HAVE ON A TYPICAL DAY WHEN YOU ARE DRINKING: 1 OR 2

## 2025-07-26 ASSESSMENT — LIFESTYLE VARIABLES
HOW MANY STANDARD DRINKS CONTAINING ALCOHOL DO YOU HAVE ON A TYPICAL DAY: 1 OR 2
AUDIT-C TOTAL SCORE: 1
SKIP TO QUESTIONS 9-10: 1
HOW OFTEN DO YOU HAVE A DRINK CONTAINING ALCOHOL: MONTHLY OR LESS
HOW OFTEN DO YOU HAVE SIX OR MORE DRINKS ON ONE OCCASION: NEVER

## 2025-07-29 ENCOUNTER — APPOINTMENT (OUTPATIENT)
Dept: MEDICAL GROUP | Facility: PHYSICIAN GROUP | Age: 45
End: 2025-07-29
Payer: COMMERCIAL

## 2025-07-29 VITALS
SYSTOLIC BLOOD PRESSURE: 140 MMHG | OXYGEN SATURATION: 98 % | HEIGHT: 64 IN | WEIGHT: 208.2 LBS | TEMPERATURE: 98.3 F | DIASTOLIC BLOOD PRESSURE: 72 MMHG | BODY MASS INDEX: 35.55 KG/M2 | HEART RATE: 115 BPM

## 2025-07-29 DIAGNOSIS — R00.2 HEART PALPITATIONS: ICD-10-CM

## 2025-07-29 DIAGNOSIS — N95.1 PERIMENOPAUSAL SYMPTOMS: ICD-10-CM

## 2025-07-29 DIAGNOSIS — Z00.00 HEALTHCARE MAINTENANCE: ICD-10-CM

## 2025-07-29 DIAGNOSIS — I10 PRIMARY HYPERTENSION: Primary | ICD-10-CM

## 2025-07-29 DIAGNOSIS — R00.2 PALPITATIONS: ICD-10-CM

## 2025-07-29 DIAGNOSIS — Z13.6 SCREENING FOR CARDIOVASCULAR CONDITION: ICD-10-CM

## 2025-07-29 DIAGNOSIS — N91.2 AMENORRHEA: ICD-10-CM

## 2025-07-29 RX ORDER — PROPRANOLOL HYDROCHLORIDE 10 MG/1
10 TABLET ORAL 3 TIMES DAILY PRN
Qty: 30 TABLET | Refills: 1 | Status: SHIPPED | OUTPATIENT
Start: 2025-07-29

## 2025-07-29 RX ORDER — OLMESARTAN MEDOXOMIL 40 MG/1
40 TABLET ORAL DAILY
Qty: 100 TABLET | Refills: 3 | Status: SHIPPED | OUTPATIENT
Start: 2025-07-29 | End: 2026-09-02

## 2025-07-29 ASSESSMENT — ENCOUNTER SYMPTOMS
WEIGHT LOSS: 0
WEAKNESS: 0
BLURRED VISION: 0
SHORTNESS OF BREATH: 0
MYALGIAS: 0
ABDOMINAL PAIN: 0
PALPITATIONS: 1
VOMITING: 0
COUGH: 0
DIZZINESS: 0
CONSTIPATION: 0
DIARRHEA: 0
CHILLS: 0
FEVER: 0
HEADACHES: 1
NAUSEA: 0

## 2025-07-29 ASSESSMENT — PATIENT HEALTH QUESTIONNAIRE - PHQ9: CLINICAL INTERPRETATION OF PHQ2 SCORE: 0

## 2025-07-29 NOTE — PROGRESS NOTES
Verbal consent was acquired by the patient to use [employer] ambient listening note generation during this visit  Subjective:     CC:  The primary encounter diagnosis was Primary hypertension. Diagnoses of Heart palpitations, Amenorrhea, Palpitations, Screening for cardiovascular condition, Healthcare maintenance, and Perimenopausal symptoms were also pertinent to this visit.    HISTORY OF THE PRESENT ILLNESS: Patient is a 44 y.o. female.   Problem   Perimenopausal Symptoms       History of Present Illness  The patient presents to the clinic today with complaints of increased frequency of palpitations and other associated symptoms. She has a history of hypertension and is currently on olmesartan. She is also experiencing symptoms potentially related to perimenopause and has plans to discuss these with her OB/GYN.    Palpitations  - The patient reports increased frequency of palpitations, even at rest, which have been waking her up at night and causing significant concern.  - She is not experiencing palpitations today but notes that they are intermittent throughout the evening, sometimes severe enough to require bed rest with a cold pack on her neck and Excedrin Migraine.  - She experiences random palpitations, typically between 5 and 6 PM after a busy day.  - Her resting heart rate at home can range from 120 to 140 beats per minute.  - She believes the effects of her morning medication may be wearing off by evening.  - She is considering propranolol as an as-needed medication for palpitations until her condition stabilizes.  - She maintains good hydration.  - The palpitations occasionally disrupt her sleep.    Hypertension  - The patient is currently on olmesartan 20 mg, and it was discussed that an increase in dosage might be necessary due to inadequate blood pressure control.  - She monitors her blood pressure at home, which typically reads in the 140s mmHg, and it was explained that this could be contributing to  her palpitations.  - She has been on antihypertensive medication for 3 years and reports no issues with olmesartan.  - She previously tried lisinopril but experienced a cough and felt unwell on it.    Tinnitus and Headache  - The patient reports tinnitus and a headache.  - She also experiences earaches, headaches, and tachycardia along with the palpitations.    Lifestyle and Weight Management  - The patient has resumed exercising after a period of inactivity and acknowledges that her symptoms may be related to her weight.  - She is making efforts to return to a healthy lifestyle, including regular exercise and a balanced diet, with a goal to lose 30 pounds by the end of the year.  - She reports no symptoms of depression or anxiety and has successfully lost weight in the past.    Perimenopausal Symptoms  - The patient feels she is entering perimenopause and plans to discuss this with her OB/GYN.  - She has not had a menstrual period since January 2025 and experiences cramping, fatigue, myalgia, headaches, and blurred vision.  - She is currently on Loestrin and plans to start a new pack tomorrow.  - She has considered discontinuing birth control to observe the effects but does not want to risk pregnancy.  - She has tried intrauterine devices (IUDs) in the past but experienced uterine contractions that caused expulsion.  - She also tried progesterone but found it unhelpful, leading to increased crying and exhaustion.    Marital Status:     GYNECOLOGICAL HISTORY:  Last Menstrual Period: January 2025    FAMILY HISTORY  Her mother has palpitations and is treated with exercise and low-dose blood pressure medication. Her grandfather also had palpitations. Her mother went through menopause early and had a full hysterectomy at the patient's current age.    ROS:   Review of Systems   Constitutional:  Negative for chills, fever, malaise/fatigue and weight loss.   HENT:  Positive for tinnitus.    Eyes:  Negative for  "blurred vision.   Respiratory:  Negative for cough and shortness of breath.    Cardiovascular:  Positive for palpitations. Negative for chest pain.   Gastrointestinal:  Negative for abdominal pain, constipation, diarrhea, nausea and vomiting.   Musculoskeletal:  Negative for myalgias.   Neurological:  Positive for headaches. Negative for dizziness and weakness.         Objective:     Exam: BP (!) 140/72 (BP Location: Left arm, Patient Position: Sitting, BP Cuff Size: Adult)   Pulse (!) 115   Temp 36.8 °C (98.3 °F) (Temporal)   Ht 1.626 m (5' 4\")   Wt 94.4 kg (208 lb 3.2 oz)   SpO2 98%  Body mass index is 35.74 kg/m².    Physical Exam  Constitutional:       General: She is not in acute distress.     Appearance: Normal appearance. She is not ill-appearing or toxic-appearing.   HENT:      Head: Normocephalic.   Eyes:      Conjunctiva/sclera: Conjunctivae normal.   Cardiovascular:      Rate and Rhythm: Normal rate and regular rhythm.      Pulses: Normal pulses.      Heart sounds: Normal heart sounds. No murmur heard.  Pulmonary:      Effort: Pulmonary effort is normal. No respiratory distress.      Breath sounds: Normal breath sounds.   Skin:     General: Skin is warm and dry.   Neurological:      General: No focal deficit present.      Mental Status: She is alert and oriented to person, place, and time.   Psychiatric:         Mood and Affect: Mood normal.         Behavior: Behavior normal.           Labs:   No recent    Assessment & Plan: Medical Decision Making   44 y.o. female with the following -    Assessment & Plan  1. Hypertension.  - Blood pressure readings today were 140/72 and 142/82 upon recheck, indicating uncontrolled hypertension. Home readings have been similar.  - Currently on olmesartan 20 mg, which will be increased to 40 mg better manage her blood pressure.  - Discussed that lifestyle changes such as weight loss and a lower salt diet may improve her blood pressure, and medication adjustments will " be considered.  - Medication sent to pharmacy.    2. Palpitations.  - Reports more frequent palpitations accompanied by ringing in her ears, earaches, headaches, and tachycardia. Symptoms occur even at rest and sometimes wake her up at night.  - Physical exam revealed tachycardia, but no murmurs were heard. Previous EKG from 2023 was normal.  - Discussed that palpitations may be related to uncontrolled hypertension and perimenopausal symptoms.  - Prescription for propranolol 10 mg, to be taken up to three times daily as needed for palpitations, will be provided. Further evaluation with a ZIO patch may be considered if symptoms persist or worsen.    3. Perimenopausal symptoms.  - Reports symptoms consistent with perimenopause, including irregular periods, cramping, fatigue, body aches, headaches, and blurred vision.  - Basic perimenopausal labs will be ordered to assess estrogen levels, follicle-stimulating hormone, luteinizing hormone, and progesterone levels.  - She will discuss these symptoms with her OB/GYN at [facility] tomorrow. If her OB/GYN does not address her concerns adequately, she is advised to return for further management.  - Labs ordered for perimenopausal assessment.      Problem List Items Addressed This Visit          Family Medicine Problems    Primary hypertension - Primary    Relevant Medications    olmesartan (BENICAR) 40 MG Tab    propranolol (INDERAL) 10 MG Tab    Other Relevant Orders    Comp Metabolic Panel    CBC WITH DIFFERENTIAL       Other    Palpitations    Relevant Medications    propranolol (INDERAL) 10 MG Tab    Other Relevant Orders    Comp Metabolic Panel    CBC WITH DIFFERENTIAL    Perimenopausal symptoms     Other Visit Diagnoses         Heart palpitations        Relevant Medications    propranolol (INDERAL) 10 MG Tab    Other Relevant Orders    Comp Metabolic Panel    CBC WITH DIFFERENTIAL    FSH/LH    ESTRADIOL    PROGESTERONE      Amenorrhea        Relevant Orders    Comp  Metabolic Panel    CBC WITH DIFFERENTIAL    FSH/LH    ESTRADIOL    PROGESTERONE      Screening for cardiovascular condition        Relevant Orders    Lipid Profile      Healthcare maintenance        Relevant Medications    propranolol (INDERAL) 10 MG Tab    Other Relevant Orders    Comp Metabolic Panel    CBC WITH DIFFERENTIAL    FSH/LH    ESTRADIOL    PROGESTERONE    Lipid Profile            Differential diagnosis, natural history, supportive care, and indications for immediate follow-up discussed.  Shared decision making approach was utilized, and patient is amendable with plan of care.  Patient understands to return to clinic or go to the emergency department if symptoms worsen. All questions and concerns addressed to the best of my knowledge.      Return if symptoms worsen or fail to improve.    Please note that this dictation was created using voice recognition software. I have made every reasonable attempt to correct obvious errors, but I expect that there are errors of grammar and possibly content that I did not discover before finalizing the note.